# Patient Record
Sex: FEMALE | Race: BLACK OR AFRICAN AMERICAN | NOT HISPANIC OR LATINO | URBAN - METROPOLITAN AREA
[De-identification: names, ages, dates, MRNs, and addresses within clinical notes are randomized per-mention and may not be internally consistent; named-entity substitution may affect disease eponyms.]

---

## 2020-03-24 ENCOUNTER — INPATIENT (INPATIENT)
Facility: HOSPITAL | Age: 55
LOS: 12 days | Discharge: HOME | End: 2020-04-06
Attending: INTERNAL MEDICINE | Admitting: INTERNAL MEDICINE
Payer: COMMERCIAL

## 2020-03-24 VITALS
RESPIRATION RATE: 28 BRPM | HEART RATE: 110 BPM | DIASTOLIC BLOOD PRESSURE: 104 MMHG | TEMPERATURE: 98 F | OXYGEN SATURATION: 91 % | SYSTOLIC BLOOD PRESSURE: 178 MMHG

## 2020-03-24 DIAGNOSIS — B34.2 CORONAVIRUS INFECTION, UNSPECIFIED: ICD-10-CM

## 2020-03-24 DIAGNOSIS — Z29.9 ENCOUNTER FOR PROPHYLACTIC MEASURES, UNSPECIFIED: ICD-10-CM

## 2020-03-24 DIAGNOSIS — R06.03 ACUTE RESPIRATORY DISTRESS: ICD-10-CM

## 2020-03-24 DIAGNOSIS — R65.10 SYSTEMIC INFLAMMATORY RESPONSE SYNDROME (SIRS) OF NON-INFECTIOUS ORIGIN WITHOUT ACUTE ORGAN DYSFUNCTION: ICD-10-CM

## 2020-03-24 DIAGNOSIS — D86.9 SARCOIDOSIS, UNSPECIFIED: ICD-10-CM

## 2020-03-24 DIAGNOSIS — Z02.9 ENCOUNTER FOR ADMINISTRATIVE EXAMINATIONS, UNSPECIFIED: ICD-10-CM

## 2020-03-24 LAB
ALBUMIN SERPL ELPH-MCNC: 3.5 G/DL — SIGNIFICANT CHANGE UP (ref 3.3–5)
ALP SERPL-CCNC: 78 U/L — SIGNIFICANT CHANGE UP (ref 40–120)
ALT FLD-CCNC: 42 U/L — HIGH (ref 4–33)
ANION GAP SERPL CALC-SCNC: 12 MMO/L — SIGNIFICANT CHANGE UP (ref 7–14)
ANISOCYTOSIS BLD QL: SLIGHT — SIGNIFICANT CHANGE UP
APTT BLD: 25.4 SEC — LOW (ref 27.5–36.3)
AST SERPL-CCNC: 50 U/L — HIGH (ref 4–32)
BASE EXCESS BLDV CALC-SCNC: 5.9 MMOL/L — SIGNIFICANT CHANGE UP
BASOPHILS # BLD AUTO: 0.02 K/UL — SIGNIFICANT CHANGE UP (ref 0–0.2)
BASOPHILS NFR BLD AUTO: 0.2 % — SIGNIFICANT CHANGE UP (ref 0–2)
BASOPHILS NFR SPEC: 0 % — SIGNIFICANT CHANGE UP (ref 0–2)
BILIRUB SERPL-MCNC: 0.7 MG/DL — SIGNIFICANT CHANGE UP (ref 0.2–1.2)
BLASTS # FLD: 0 % — SIGNIFICANT CHANGE UP (ref 0–0)
BLOOD GAS VENOUS - CREATININE: 0.8 MG/DL — SIGNIFICANT CHANGE UP (ref 0.5–1.3)
BUN SERPL-MCNC: 14 MG/DL — SIGNIFICANT CHANGE UP (ref 7–23)
CALCIUM SERPL-MCNC: 8.6 MG/DL — SIGNIFICANT CHANGE UP (ref 8.4–10.5)
CHLORIDE BLDV-SCNC: 103 MMOL/L — SIGNIFICANT CHANGE UP (ref 96–108)
CHLORIDE SERPL-SCNC: 98 MMOL/L — SIGNIFICANT CHANGE UP (ref 98–107)
CK MB BLD-MCNC: 0.7 — SIGNIFICANT CHANGE UP (ref 0–2.5)
CK MB BLD-MCNC: 2.14 NG/ML — SIGNIFICANT CHANGE UP (ref 1–4.7)
CK SERPL-CCNC: 288 U/L — HIGH (ref 25–170)
CO2 SERPL-SCNC: 27 MMOL/L — SIGNIFICANT CHANGE UP (ref 22–31)
CREAT SERPL-MCNC: 0.77 MG/DL — SIGNIFICANT CHANGE UP (ref 0.5–1.3)
EOSINOPHIL # BLD AUTO: 0.01 K/UL — SIGNIFICANT CHANGE UP (ref 0–0.5)
EOSINOPHIL NFR BLD AUTO: 0.1 % — SIGNIFICANT CHANGE UP (ref 0–6)
EOSINOPHIL NFR FLD: 0 % — SIGNIFICANT CHANGE UP (ref 0–6)
FERRITIN SERPL-MCNC: 278.8 NG/ML — HIGH (ref 15–150)
GAS PNL BLDV: 138 MMOL/L — SIGNIFICANT CHANGE UP (ref 136–146)
GIANT PLATELETS BLD QL SMEAR: PRESENT — SIGNIFICANT CHANGE UP
GLUCOSE BLDV-MCNC: 94 MG/DL — SIGNIFICANT CHANGE UP (ref 70–99)
GLUCOSE SERPL-MCNC: 92 MG/DL — SIGNIFICANT CHANGE UP (ref 70–99)
HCO3 BLDV-SCNC: 28 MMOL/L — HIGH (ref 20–27)
HCT VFR BLD CALC: 35.8 % — SIGNIFICANT CHANGE UP (ref 34.5–45)
HCT VFR BLDV CALC: 37.5 % — SIGNIFICANT CHANGE UP (ref 34.5–45)
HGB BLD-MCNC: 11.5 G/DL — SIGNIFICANT CHANGE UP (ref 11.5–15.5)
HGB BLDV-MCNC: 12.2 G/DL — SIGNIFICANT CHANGE UP (ref 11.5–15.5)
HYPOCHROMIA BLD QL: SLIGHT — SIGNIFICANT CHANGE UP
IMM GRANULOCYTES NFR BLD AUTO: 1.3 % — SIGNIFICANT CHANGE UP (ref 0–1.5)
INR BLD: 1.22 — HIGH (ref 0.88–1.17)
LACTATE BLDV-MCNC: 1.2 MMOL/L — SIGNIFICANT CHANGE UP (ref 0.5–2)
LDH SERPL L TO P-CCNC: 526 U/L — HIGH (ref 135–225)
LYMPHOCYTES # BLD AUTO: 0.18 K/UL — LOW (ref 1–3.3)
LYMPHOCYTES # BLD AUTO: 1.4 % — LOW (ref 13–44)
LYMPHOCYTES NFR SPEC AUTO: 0.9 % — LOW (ref 13–44)
MACROCYTES BLD QL: SLIGHT — SIGNIFICANT CHANGE UP
MAGNESIUM SERPL-MCNC: 2.2 MG/DL — SIGNIFICANT CHANGE UP (ref 1.6–2.6)
MCHC RBC-ENTMCNC: 30.3 PG — SIGNIFICANT CHANGE UP (ref 27–34)
MCHC RBC-ENTMCNC: 32.1 % — SIGNIFICANT CHANGE UP (ref 32–36)
MCV RBC AUTO: 94.2 FL — SIGNIFICANT CHANGE UP (ref 80–100)
METAMYELOCYTES # FLD: 0 % — SIGNIFICANT CHANGE UP (ref 0–1)
MONOCYTES # BLD AUTO: 0.33 K/UL — SIGNIFICANT CHANGE UP (ref 0–0.9)
MONOCYTES NFR BLD AUTO: 2.6 % — SIGNIFICANT CHANGE UP (ref 2–14)
MONOCYTES NFR BLD: 0.9 % — LOW (ref 2–9)
MYELOCYTES NFR BLD: 0 % — SIGNIFICANT CHANGE UP (ref 0–0)
NEUTROPHIL AB SER-ACNC: 98.2 % — HIGH (ref 43–77)
NEUTROPHILS # BLD AUTO: 12.08 K/UL — HIGH (ref 1.8–7.4)
NEUTROPHILS NFR BLD AUTO: 94.4 % — HIGH (ref 43–77)
NEUTS BAND # BLD: 0 % — SIGNIFICANT CHANGE UP (ref 0–6)
NRBC # FLD: 0 K/UL — SIGNIFICANT CHANGE UP (ref 0–0)
NT-PROBNP SERPL-SCNC: 35.64 PG/ML — SIGNIFICANT CHANGE UP
OTHER - HEMATOLOGY %: 0 — SIGNIFICANT CHANGE UP
OVALOCYTES BLD QL SMEAR: SLIGHT — SIGNIFICANT CHANGE UP
PCO2 BLDV: 51 MMHG — SIGNIFICANT CHANGE UP (ref 41–51)
PH BLDV: 7.4 PH — SIGNIFICANT CHANGE UP (ref 7.32–7.43)
PHOSPHATE SERPL-MCNC: 3.2 MG/DL — SIGNIFICANT CHANGE UP (ref 2.5–4.5)
PLATELET # BLD AUTO: 264 K/UL — SIGNIFICANT CHANGE UP (ref 150–400)
PLATELET COUNT - ESTIMATE: NORMAL — SIGNIFICANT CHANGE UP
PMV BLD: 10.8 FL — SIGNIFICANT CHANGE UP (ref 7–13)
PO2 BLDV: 27 MMHG — LOW (ref 35–40)
POIKILOCYTOSIS BLD QL AUTO: SLIGHT — SIGNIFICANT CHANGE UP
POTASSIUM BLDV-SCNC: 3.3 MMOL/L — LOW (ref 3.4–4.5)
POTASSIUM SERPL-MCNC: 3.6 MMOL/L — SIGNIFICANT CHANGE UP (ref 3.5–5.3)
POTASSIUM SERPL-SCNC: 3.6 MMOL/L — SIGNIFICANT CHANGE UP (ref 3.5–5.3)
PROMYELOCYTES # FLD: 0 % — SIGNIFICANT CHANGE UP (ref 0–0)
PROT SERPL-MCNC: 7.9 G/DL — SIGNIFICANT CHANGE UP (ref 6–8.3)
PROTHROM AB SERPL-ACNC: 14.1 SEC — HIGH (ref 9.8–13.1)
RBC # BLD: 3.8 M/UL — SIGNIFICANT CHANGE UP (ref 3.8–5.2)
RBC # FLD: 13 % — SIGNIFICANT CHANGE UP (ref 10.3–14.5)
REVIEW TO FOLLOW: YES — SIGNIFICANT CHANGE UP
SAO2 % BLDV: 36 % — LOW (ref 60–85)
SODIUM SERPL-SCNC: 137 MMOL/L — SIGNIFICANT CHANGE UP (ref 135–145)
TROPONIN T, HIGH SENSITIVITY: 10 NG/L — SIGNIFICANT CHANGE UP (ref ?–14)
VARIANT LYMPHS # BLD: 0 % — SIGNIFICANT CHANGE UP
WBC # BLD: 12.79 K/UL — HIGH (ref 3.8–10.5)
WBC # FLD AUTO: 12.79 K/UL — HIGH (ref 3.8–10.5)

## 2020-03-24 PROCEDURE — 99222 1ST HOSP IP/OBS MODERATE 55: CPT | Mod: GC,AI

## 2020-03-24 PROCEDURE — 71045 X-RAY EXAM CHEST 1 VIEW: CPT | Mod: 26

## 2020-03-24 RX ORDER — IPRATROPIUM BROMIDE 0.2 MG/ML
1 SOLUTION, NON-ORAL INHALATION EVERY 6 HOURS
Refills: 0 | Status: DISCONTINUED | OUTPATIENT
Start: 2020-03-24 | End: 2020-04-06

## 2020-03-24 RX ORDER — HYDROXYCHLOROQUINE SULFATE 200 MG
400 TABLET ORAL DAILY
Refills: 0 | Status: DISCONTINUED | OUTPATIENT
Start: 2020-03-24 | End: 2020-03-24

## 2020-03-24 RX ORDER — EPINEPHRINE 0.3 MG/.3ML
0.3 INJECTION INTRAMUSCULAR; SUBCUTANEOUS ONCE
Refills: 0 | Status: COMPLETED | OUTPATIENT
Start: 2020-03-24 | End: 2020-03-24

## 2020-03-24 RX ORDER — HYDROXYCHLOROQUINE SULFATE 200 MG
400 TABLET ORAL DAILY
Refills: 0 | Status: COMPLETED | OUTPATIENT
Start: 2020-03-24 | End: 2020-03-25

## 2020-03-24 RX ORDER — CHOLECALCIFEROL (VITAMIN D3) 125 MCG
1000 CAPSULE ORAL DAILY
Refills: 0 | Status: DISCONTINUED | OUTPATIENT
Start: 2020-03-24 | End: 2020-04-06

## 2020-03-24 RX ORDER — ALBUTEROL 90 UG/1
2.5 AEROSOL, METERED ORAL EVERY 6 HOURS
Refills: 0 | Status: DISCONTINUED | OUTPATIENT
Start: 2020-03-24 | End: 2020-03-24

## 2020-03-24 RX ORDER — CHOLECALCIFEROL (VITAMIN D3) 125 MCG
1 CAPSULE ORAL
Qty: 0 | Refills: 0 | DISCHARGE

## 2020-03-24 RX ORDER — AZITHROMYCIN 500 MG/1
500 TABLET, FILM COATED ORAL DAILY
Refills: 0 | Status: DISCONTINUED | OUTPATIENT
Start: 2020-03-24 | End: 2020-03-24

## 2020-03-24 RX ORDER — METHOTREXATE 2.5 MG/1
0 TABLET ORAL
Qty: 0 | Refills: 0 | DISCHARGE

## 2020-03-24 RX ORDER — ALBUTEROL 90 UG/1
0.5 AEROSOL, METERED ORAL
Qty: 0 | Refills: 0 | DISCHARGE

## 2020-03-24 RX ORDER — ALBUTEROL 90 UG/1
6 AEROSOL, METERED ORAL ONCE
Refills: 0 | Status: COMPLETED | OUTPATIENT
Start: 2020-03-24 | End: 2020-03-24

## 2020-03-24 RX ORDER — BUDESONIDE, MICRONIZED 100 %
2 POWDER (GRAM) MISCELLANEOUS
Qty: 0 | Refills: 0 | DISCHARGE

## 2020-03-24 RX ORDER — IPRATROPIUM BROMIDE 0.2 MG/ML
2 SOLUTION, NON-ORAL INHALATION ONCE
Refills: 0 | Status: COMPLETED | OUTPATIENT
Start: 2020-03-24 | End: 2020-03-24

## 2020-03-24 RX ORDER — BUDESONIDE, MICRONIZED 100 %
0.5 POWDER (GRAM) MISCELLANEOUS DAILY
Refills: 0 | Status: DISCONTINUED | OUTPATIENT
Start: 2020-03-24 | End: 2020-03-26

## 2020-03-24 RX ORDER — ENOXAPARIN SODIUM 100 MG/ML
40 INJECTION SUBCUTANEOUS DAILY
Refills: 0 | Status: DISCONTINUED | OUTPATIENT
Start: 2020-03-24 | End: 2020-04-06

## 2020-03-24 RX ORDER — FLUTICASONE PROPIONATE AND SALMETEROL 50; 250 UG/1; UG/1
0 POWDER ORAL; RESPIRATORY (INHALATION)
Qty: 0 | Refills: 0 | DISCHARGE

## 2020-03-24 RX ORDER — ALBUTEROL 90 UG/1
2 AEROSOL, METERED ORAL EVERY 6 HOURS
Refills: 0 | Status: DISCONTINUED | OUTPATIENT
Start: 2020-03-24 | End: 2020-04-06

## 2020-03-24 RX ORDER — SODIUM CHLORIDE 9 MG/ML
1000 INJECTION INTRAMUSCULAR; INTRAVENOUS; SUBCUTANEOUS ONCE
Refills: 0 | Status: COMPLETED | OUTPATIENT
Start: 2020-03-24 | End: 2020-03-24

## 2020-03-24 RX ADMIN — SODIUM CHLORIDE 1000 MILLILITER(S): 9 INJECTION INTRAMUSCULAR; INTRAVENOUS; SUBCUTANEOUS at 11:09

## 2020-03-24 RX ADMIN — ALBUTEROL 6 PUFF(S): 90 AEROSOL, METERED ORAL at 11:00

## 2020-03-24 RX ADMIN — Medication 1 PUFF(S): at 22:53

## 2020-03-24 RX ADMIN — EPINEPHRINE 0.3 MILLIGRAM(S): 0.3 INJECTION INTRAMUSCULAR; SUBCUTANEOUS at 11:09

## 2020-03-24 RX ADMIN — ALBUTEROL 2 PUFF(S): 90 AEROSOL, METERED ORAL at 22:53

## 2020-03-24 RX ADMIN — Medication 40 MILLIGRAM(S): at 11:09

## 2020-03-24 RX ADMIN — Medication 2 PUFF(S): at 13:59

## 2020-03-24 RX ADMIN — AZITHROMYCIN 500 MILLIGRAM(S): 500 TABLET, FILM COATED ORAL at 20:08

## 2020-03-24 NOTE — H&P ADULT - NSHPREVIEWOFSYSTEMS_GEN_ALL_CORE
REVIEW OF SYSTEMS:  CONSTITUTIONAL: No fever, chills, night sweats, or fatigue  EYES: No eye pain, visual disturbances, or discharge  ENMT:  No difficulty hearing, tinnitus, vertigo; No sinus or throat pain  NECK: No pain or stiffness  BREASTS: No pain, masses, or nipple discharge  RESPIRATORY: No cough, wheezing, or hemoptysis; No shortness of breath  CARDIOVASCULAR: No chest pain, palpitations, dizziness, or leg swelling  GASTROINTESTINAL: No abdominal or epigastric pain. No nausea, vomiting, or hematemesis; No diarrhea or constipation. No melena or hematochezia.  GENITOURINARY: No dysuria, frequency, hematuria, or incontinence  NEUROLOGICAL: No headaches, memory loss, loss of strength, numbness, or tremors  SKIN: No itching, burning, rashes, or lesions   LYMPH NODES: No enlarged glands  ENDOCRINE: No heat or cold intolerance; No hair loss  MUSCULOSKELETAL: No joint pain or swelling; No muscle, back, or extremity pain  PSYCHIATRIC: No depression, anxiety, mood swings, or difficulty sleeping  HEME/LYMPH: No easy bruising, or bleeding gums  ALLERGY AND IMMUNOLOGIC: No hives or eczema REVIEW OF SYSTEMS:  CONSTITUTIONAL: No fever, chills, night sweats, or fatigue  EYES: No eye pain, visual disturbances, or discharge  ENMT:  No difficulty hearing, tinnitus, vertigo; No sinus or throat pain  NECK: No pain or stiffness  RESPIRATORY: Cough. Denies wheezing, or hemoptysis.  CARDIOVASCULAR: No chest pain, palpitations, dizziness, or leg swelling  GASTROINTESTINAL: No abdominal or epigastric pain. No nausea, vomiting, or hematemesis; No diarrhea or constipation. No melena or hematochezia.  GENITOURINARY: No dysuria, frequency, hematuria, or incontinence  NEUROLOGICAL: No headaches, memory loss, loss of strength, numbness, or tremors  SKIN: No itching, burning, rashes, or lesions   LYMPH NODES: No enlarged glands  ENDOCRINE: No heat or cold intolerance  MUSCULOSKELETAL: No joint pain or swelling;   HEME/LYMPH: No easy bruising, or bleeding gums  ALLERGY AND IMMUNOLOGIC: No hives or eczema REVIEW OF SYSTEMS:  CONSTITUTIONAL: No fever, chills, night sweats, or fatigue  EYES: No eye pain, visual disturbances, or discharge  ENMT:  No difficulty hearing, tinnitus, vertigo; No sinus or throat pain  NECK: No pain or stiffness  RESPIRATORY: Cough. Denies wheezing, or hemoptysis.  CARDIOVASCULAR: No chest pain, palpitations, dizziness, or leg swelling  GASTROINTESTINAL: No abdominal or epigastric pain. No nausea, vomiting, or hematemesis; No diarrhea or constipation. No melena or hematochezia.  GENITOURINARY: No dysuria, frequency, hematuria, or incontinence  NEUROLOGICAL: No headaches, memory loss, loss of strength, numbness, or tremors  SKIN: No itching, burning, rashes, or lesions   ENDOCRINE: No heat or cold intolerance  MUSCULOSKELETAL: No joint pain or swelling;   HEME/LYMPH: No easy bruising, or bleeding gums  ALLERGY AND IMMUNOLOGIC: No hives or eczema

## 2020-03-24 NOTE — H&P ADULT - ASSESSMENT
54/F w/ sarcoidosis (2017, on mtx 4x/wk, plaquenil 400 QD, prednisone 5) and no other significant PMH p/w 2w hx of productive cough and worsening SOB.  Found to have b/l LL opacities on CXR, tested positive for COVID-19 at outside facility a/f COVID-19 PNA w/ possible sarcoidosis flare.

## 2020-03-24 NOTE — ED ADULT NURSE REASSESSMENT NOTE - NS ED NURSE REASSESS COMMENT FT1
Report received from day RN. Patient resting comfortably. Reports improvement in shortness of breath. Respirations even and unlabored. NSR on cardiac monitor. VS as noted. Will continue to monitor.

## 2020-03-24 NOTE — ED ADULT TRIAGE NOTE - CHIEF COMPLAINT QUOTE
Pt c/o SOB and cough. Pt with tachypnea. Pt had Covid test with no results yet. Pt 02 sat 70% upon EMS arrival.

## 2020-03-24 NOTE — ED PROVIDER NOTE - PHYSICAL EXAMINATION
Vital signs reviewed.   CONSTITUTIONAL: Ill=appearing; well-nourished; in moderate respiratory distress.   HEAD: Normocephalic, atraumatic.  EYES: PERRL, EOM intact, conjunctiva and sclera WNL.  ENT: normal nose; no rhinorrhea; normal pharynx with no tonsillar hypertrophy, no erythema, no exudate, no lymphadenopathy.  NECK/LYMPH: Supple; non-tender; no cervical lymphadenopathy.  CARD: Tachycardic, normal S1, S2, no lower extremity edema or calf tenderness   RESP: Tachypneic, +accessory muscle use, +intercostal retraction, breath sounds diffusely decreased bilaterally; no wheezes, rhonchi, or rales.  ABD/GI: soft, non-distended; non-tender; no palpable organomegaly, no pulsatile mass.  EXT/MS: moves all extremities; distal pulses are normal, no pedal edema.  SKIN: Normal for age and race; warm; dry; good turgor; no apparent lesions or exudate noted.  NEURO: Awake, alert, oriented x 3, no gross deficits, CN II-XII grossly intact, no motor or sensory deficit noted.  PSYCH: Normal mood; appropriate affect.

## 2020-03-24 NOTE — H&P ADULT - NSHPSOCIALHISTORY_GEN_ALL_CORE
- live w/  in house  - lives w/ son, who has not been to the house for weeks  - no tobacco use  - occasional etoh use, 1-2 drinks per month; multivitamins  - no illicit drug

## 2020-03-24 NOTE — ED PROVIDER NOTE - OBJECTIVE STATEMENT
53 y/o F PMHx sarcoidosis here c/o worsening shortness of breath and CADE x 2 weeks. Pt states her daily Prednisone was decreased to 5mg, 3 weeks ago, prior to onset of sx. States this usually happens to her when her doses are decreased. +chronic cough, attributed to sarcoidosis. No sick contacts. Of note, pt and her  were tested for COVID at the [facility] through 5 days ago as a precaution,  tested negative, her covid test is still pending. Denies fevers/chills, lower ext edema, calf pain or chest pain. Has been working from home for several weeks. 55 y/o F PMHx sarcoidosis here c/o worsening shortness of breath and CADE x 2 weeks. Pt states her daily Prednisone was decreased to 5mg, 3 weeks ago, prior to onset of sx. States this usually happens to her when her doses are decreased. +chronic cough, attributed to sarcoidosis. No sick contacts. Of note, pt and her  were tested for COVID at the [facility] through 5 days ago as a precaution,  tested negative, her covid test is still pending. Went to [facility] 5 days ago, had a normal cxr and was given a z-pack, which she has one dose remaining for. Denies fevers/chills, lower ext edema, calf pain or chest pain. Has been working from home for several weeks.

## 2020-03-24 NOTE — ED PROVIDER NOTE - NS ED ROS FT
ROS:  GENERAL: No fever, no chills  EYES: no change in vision  HEENT: no trouble swallowing, no trouble speaking  CARDIAC: no chest pain  PULMONARY: +cough, +shortness of breath  GI: no abdominal pain, no nausea, no vomiting, no diarrhea, no constipation  : No dysuria, no frequency, no change in appearance, or odor of urine  SKIN: no rashes  NEURO: no headache, no weakness  MSK: No joint pain

## 2020-03-24 NOTE — ED PROVIDER NOTE - CLINICAL SUMMARY MEDICAL DECISION MAKING FREE TEXT BOX
53 y/o F PMHx sarcoidosis here c/o worsening shortness of breath and CADE x 2 weeks. Pt states her daily Prednisone was decreased to 5mg, 3 weeks ago, prior to onset of sx. Pending results of outpatient covid test. Pt appears tachypneic, in moderate respiratory distress, agrees with intubation if necessary. Concern for covid, causing sarcoid exacerbation. Plan epi IM, albuterol/ipratropium w/ spacer, steroids, cxr, reassess

## 2020-03-24 NOTE — H&P ADULT - PROBLEM SELECTOR PLAN 2
p/w 2 wk hx of productive cough and worsening SOB, found to have b/l lower lobe opacities  - RVP neg  - tested positive for COVID-19 at outside facility, pending COVID-19 PCR here  - previously given solumedrol 40, will c/w home Prednisone 5mg PO QD  - currently sating mid-90s on 6L NC, not on home O2 at baseline  - goal O2 sat low to mid-90s given lung hx, can escalate to NRB; will c/s MICU for sharply increasing O2 demand or increased WOB  - previously given Zpack; start azithromycin 500 PO QD  - daily EKG  - replete Mg to 3

## 2020-03-24 NOTE — H&P ADULT - NSHPPHYSICALEXAM_GEN_ALL_CORE
Vital Signs Last 24 Hrs  T(C): 36.4 (24 Mar 2020 12:40), Max: 37.2 (24 Mar 2020 11:15)  T(F): 97.5 (24 Mar 2020 12:40), Max: 98.9 (24 Mar 2020 11:15)  HR: 90 (24 Mar 2020 18:10) (90 - 120)  BP: 138/82 (24 Mar 2020 18:10) (135/80 - 178/104)  BP(mean): --  RR: 30 (24 Mar 2020 18:10) (28 - 38)  SpO2: 98% (24 Mar 2020 18:10) (91% - 99%)    VITALS:     GENERAL: NAD, lying in bed comfortably  HEAD:  Atraumatic, Normocephalic  EYES: EOMI, PERRLA, conjunctiva and sclera clear  ENT: Moist mucous membranes  NECK: Supple, No JVD  CHEST/LUNG: Clear to auscultation bilaterally; No rales, rhonchi, wheezing, or rubs. Unlabored respirations  HEART: Regular rate and rhythm; No murmurs, rubs, or gallops  ABDOMEN: Bowel sounds present; Soft, Nontender, Nondistended. No hepatomegally  EXTREMITIES:  2+ Peripheral Pulses, brisk capillary refill. No clubbing, cyanosis, or edema  NERVOUS SYSTEM:  Alert & Oriented X3, speech clear. No deficits   MSK: FROM all 4 extremities, full and equal strength  SKIN: No rashes or lesions Vital Signs Last 24 Hrs  T(C): 36.4 (24 Mar 2020 12:40), Max: 37.2 (24 Mar 2020 11:15)  T(F): 97.5 (24 Mar 2020 12:40), Max: 98.9 (24 Mar 2020 11:15)  HR: 90 (24 Mar 2020 18:10) (90 - 120)  BP: 138/82 (24 Mar 2020 18:10) (135/80 - 178/104)  BP(mean): --  RR: 30 (24 Mar 2020 18:10) (28 - 38)  SpO2: 98% (24 Mar 2020 18:10) (91% - 99%)    GENERAL: in ED stretcher, on 6L NC, NAD  HEAD:  Atraumatic, Normocephalic  EYES: EOMI, PERRLA, conjunctiva and sclera clear  ENT: Moist mucous membranes  NECK: Supple, No JVD  CHEST/LUNG: Diminished lung sounds in all fields, but CTABL; No rales, rhonchi, wheezing, or rubs. Unlabored respirations  HEART: Tachycardia to 100; regular rhythm; no M/R/G  ABDOMEN: Bowel sounds present; Soft, Nontender, Nondistended  EXTREMITIES:  2+ Peripheral Pulses, brisk capillary refill. No clubbing, cyanosis, or edema  NERVOUS SYSTEM:  Alert & Oriented X3, speech clear. No deficits   MSK: FROM all 4 extremities, full and equal strength  SKIN: No rashes or lesions

## 2020-03-24 NOTE — H&P ADULT - NSHPLABSRESULTS_GEN_ALL_CORE
11.5   12.79 )-----------( 264      ( 24 Mar 2020 11:06 )             35.8     03-24    137  |  98  |  14  ----------------------------<  92  3.6   |  27  |  0.77    Ca    8.6      24 Mar 2020 11:06    TPro  7.9  /  Alb  3.5  /  TBili  0.7  /  DBili  x   /  AST  50<H>  /  ALT  42<H>  /  AlkPhos  78  03-24    Blood Gas Venous Comprehensive (03.24.20 @ 11:06)    Blood Gas Venous - Lactate: 1.2: Please note updated reference range. mmol/L    pH, Venous: 7.40 pH    pCO2, Venous: 51 mmHg    pO2, Venous: 27 mmHg    HCO3, Venous: 28 mmol/L    Troponin T, High Sensitivity (03.24.20 @ 11:06)    Troponin T, High Sensitivity: 10  Serum Pro-Brain Natriuretic Peptide (03.24.20 @ 11:06)    Serum Pro-Brain Natriuretic Peptide: 35.64    < from: Xray Chest 1 View-PORTABLE IMMEDIATE (03.24.20 @ 11:06) >    INTERPRETATION:  Indication: Shortness of breath.    Technique: Single upright portable view of the chest.    Comparison: None.    Findings: The cardiac silhouette is normal in size. There are small airspace opacities in the bilateral lower lobes. The hilar and mediastinal structures appear unremarkable. The visualized osseous structures are intact.    Impression: Multifocal pneumonia involving the bilateral lower lobes.    < end of copied text >

## 2020-03-24 NOTE — H&P ADULT - NSHPOUTPATIENTPROVIDERS_GEN_ALL_CORE
PCP: Dr. Kyle Conner, [facility], [facility], [phone].  Pulmonology: Amos Page, [facility], [phone].

## 2020-03-24 NOTE — H&P ADULT - ATTENDING COMMENTS
pt with known history of sarcoidosis - will obtain pulm eval in am, for now d/c mtx, continue with home dose of steroids and plaquenil.  She tested + for Covid at an outside facility - supportive care.  monitor respiratory status. will hold off on further antibiotics for now.

## 2020-03-24 NOTE — H&P ADULT - PROBLEM SELECTOR PLAN 1
p/w tachycardia, tachypnea, and leukocytosis likely in the setting COVID-19 infection  - see COVID-19 infection below

## 2020-03-24 NOTE — H&P ADULT - HISTORY OF PRESENT ILLNESS
whitish sputum cough, SOB worsening, ... gave azithromycin x5 days, no sick contact, no recent travel, just to  for test;    sarcoid, 2017, not hospitalized for, dx lung bx (in [place]), f/u Amos Stanley of [facility] pulm, naproxen, hydrochloriquine 400 QD, prednisone as high as 40 and now 5 mg (sarcoid pain left eye, blurry vision)    PMH: none    PSH: none    FH:  Dad  from kidney failure; mom is healthy  6 siblings, all healthy    allergies: NKDA    pharm: [facility], [address]    SH:  live w/  in house  lives w/ son  no tobacco use, occassional etoh use, 1-2 drinks per month; multivitamins, no illicit drug    PCP: Dr. Kyle Conner, [phone], [facility] Ms. Nguyen is a 54/F w/ sarcoidosis (dx 2017; on methotrexate and hydroxychloroquine) p/w 2wk hx of cough producing whitish sputum now w/ worsening SOB.  Pt had previously gone to  to test for COVID-19; she received confirmation tonight that her test was positive.  Five days ago, she presented to [facility] w/ similar sxs and was given a Zpack and Prednisone 20mg x4days.  She did not improved, and so presented to our ED.  She denies hx of fever, chills, N/V/D/C, abd pain.      Her sarcoidosis was diagnosed in 2017 by lung biopsy (at a hospital somewhere in [place]).  She was never hospitalized for it.  Ms. Nguyen follows w/ Dr. Amos Page of [facility], and is currently on naproxen, methotrexate, and hydrochloriquine 400 QD (confirmed w/ [facility] on [facility]).  Dr. Page had reduced her maintenance Prednisone to 5mg QD about 3 w/a.  Previous attempts to reduce her steroids had resulted in flares c/b left eye pain and blurry vision.      In the ED, pt was       FH:  Dad  from kidney failure; mom is healthy  6 siblings, all healthy    allergies: NKDA    pharm: [facility], [address] [facility]    SH:  live w/  in house  lives w/ son  no tobacco use, occassional etoh use, 1-2 drinks per month; multivitamins, no illicit drug    PCP: Dr. Kyle Conner, [phone], [facility] Ms. Nguyen is a 54/F w/ sarcoidosis (dx 2017; on methotrexate and hydroxychloroquine) p/w 2wk hx of cough producing whitish sputum now w/ worsening SOB.  Pt had previously gone to  to test for COVID-19; she received confirmation tonight that her test was positive.  Five days ago, she presented to [facility] w/ similar sxs and was given a Zpack and Prednisone 20mg x4days.  She did not improved, and so presented to our ED.  She denies hx of fever, chills, N/V/D/C, abd pain.      Her sarcoidosis was diagnosed in 2017 by lung biopsy (at a hospital somewhere in [place]).  She was never hospitalized for it.  Ms. Nguyen follows w/ Dr. Amos Page of [facility], and is currently on naproxen, methotrexate, and hydrochloriquine 400 QD (confirmed w/ [facility] on [facility]).  Dr. Page had reduced her maintenance Prednisone to 5mg QD about 3 w/a.  Previous attempts to reduce her steroids had resulted in flares c/b left eye pain and blurry vision.      In the ED, pt was afebrile 97.6,       FH:  Dad  from kidney failure; mom is healthy  6 siblings, all healthy    allergies: NKDA    [facility], [address] [facility]    SH:  live w/  in house  lives w/ son  no tobacco use, occassional etoh use, 1-2 drinks per month; multivitamins, no illicit drug    PCP: Dr. Kyle Conner, [phone], [facility] Ms. Nguyen is a 54/F w/ sarcoidosis (dx 2017; on methotrexate and hydroxychloroquine) p/w 2wk hx of cough producing whitish sputum now w/ worsening SOB.  Pt had previously gone to  to test for COVID-19; she received confirmation tonight that her test was positive.  Five days ago, she presented to [facility] w/ similar sxs and was given a Zpack and Prednisone 20mg x4days.  She did not improved, and so presented to our ED.  She denies hx of fever, chills, N/V/D/C, abd pain.      Her sarcoidosis was diagnosed in 2017 by lung biopsy (at a hospital somewhere in [place]).  She was never hospitalized for it.  Ms. Nguyen follows w/ Dr. Amos Page of [facility], and is currently on naproxen, methotrexate, and hydrochloriquine 400 QD (confirmed w/ [facility] on [facility]).  Dr. Page had reduced her maintenance Prednisone to 5mg QD about 3 w/a.  Previous attempts to reduce her steroids had resulted in flares c/b left eye pain and blurry vision.      In the ED, pt was afebrile 97.6, tachycardic 120, -178/, tachypneic to 38; sating 91% on 3L NC moved to 96% on 6L NC.  CXR shows b/l LL opacities.  Given Epinephrine 0.3mg, Levoquine, Solumedrol 40, and 1L NS.

## 2020-03-24 NOTE — ED PROVIDER NOTE - ATTENDING CONTRIBUTION TO CARE
55y/o F PMHx sarcoidosis c/o worsening shortness of breath and CADE x 2 weeks. Pending results of outpatient covid test. Pt appears tachypneic, in moderate respiratory distress, agrees with intubation if necessary. Concern for covid, causing sarcoid exacerbation. epi IM, albuterol/ipratropium w/ spacer, steroids, cxr, reassess.   GEN - NAD; well appearing; A+O x3   HEAD - NC/AT     EYES - EOMI, no conjunctival pallor, no scleral icterus  ENT -   mucous membranes  moist , no discharge      NECK - Neck supple  PULM - rales bilaterally   COR -  RRR, S1 S2, no murmurs  ABD - , ND, NT, soft, no guarding, no rebound, no masses    BACK - no CVA tenderness, nontender spine     EXTREMS - no edema, no deformity, warm and well perfused    SKIN - no rash or bruising      NEUROLOGIC - alert, sensation nl, motor 5/5 RUE/LUE/RLE/LLE

## 2020-03-25 LAB
ALBUMIN SERPL ELPH-MCNC: 3.4 G/DL — SIGNIFICANT CHANGE UP (ref 3.3–5)
ALP SERPL-CCNC: 72 U/L — SIGNIFICANT CHANGE UP (ref 40–120)
ALT FLD-CCNC: 42 U/L — HIGH (ref 4–33)
ANION GAP SERPL CALC-SCNC: 9 MMO/L — SIGNIFICANT CHANGE UP (ref 7–14)
AST SERPL-CCNC: 44 U/L — HIGH (ref 4–32)
BASOPHILS # BLD AUTO: 0.01 K/UL — SIGNIFICANT CHANGE UP (ref 0–0.2)
BASOPHILS NFR BLD AUTO: 0.1 % — SIGNIFICANT CHANGE UP (ref 0–2)
BILIRUB SERPL-MCNC: 0.9 MG/DL — SIGNIFICANT CHANGE UP (ref 0.2–1.2)
BUN SERPL-MCNC: 14 MG/DL — SIGNIFICANT CHANGE UP (ref 7–23)
CALCIUM SERPL-MCNC: 9 MG/DL — SIGNIFICANT CHANGE UP (ref 8.4–10.5)
CHLORIDE SERPL-SCNC: 99 MMOL/L — SIGNIFICANT CHANGE UP (ref 98–107)
CO2 SERPL-SCNC: 29 MMOL/L — SIGNIFICANT CHANGE UP (ref 22–31)
CREAT SERPL-MCNC: 0.8 MG/DL — SIGNIFICANT CHANGE UP (ref 0.5–1.3)
CRP SERPL-MCNC: 97.2 MG/L — HIGH
EOSINOPHIL # BLD AUTO: 0.01 K/UL — SIGNIFICANT CHANGE UP (ref 0–0.5)
EOSINOPHIL NFR BLD AUTO: 0.1 % — SIGNIFICANT CHANGE UP (ref 0–6)
GLUCOSE SERPL-MCNC: 88 MG/DL — SIGNIFICANT CHANGE UP (ref 70–99)
HCT VFR BLD CALC: 34.3 % — LOW (ref 34.5–45)
HGB BLD-MCNC: 11.2 G/DL — LOW (ref 11.5–15.5)
IMM GRANULOCYTES NFR BLD AUTO: 1.5 % — SIGNIFICANT CHANGE UP (ref 0–1.5)
LYMPHOCYTES # BLD AUTO: 0.23 K/UL — LOW (ref 1–3.3)
LYMPHOCYTES # BLD AUTO: 2 % — LOW (ref 13–44)
MAGNESIUM SERPL-MCNC: 1.9 MG/DL — SIGNIFICANT CHANGE UP (ref 1.6–2.6)
MANUAL SMEAR VERIFICATION: SIGNIFICANT CHANGE UP
MCHC RBC-ENTMCNC: 31 PG — SIGNIFICANT CHANGE UP (ref 27–34)
MCHC RBC-ENTMCNC: 32.7 % — SIGNIFICANT CHANGE UP (ref 32–36)
MCV RBC AUTO: 95 FL — SIGNIFICANT CHANGE UP (ref 80–100)
MONOCYTES # BLD AUTO: 0.33 K/UL — SIGNIFICANT CHANGE UP (ref 0–0.9)
MONOCYTES NFR BLD AUTO: 2.9 % — SIGNIFICANT CHANGE UP (ref 2–14)
NEUTROPHILS # BLD AUTO: 10.8 K/UL — HIGH (ref 1.8–7.4)
NEUTROPHILS NFR BLD AUTO: 93.4 % — HIGH (ref 43–77)
NRBC # FLD: 0 K/UL — SIGNIFICANT CHANGE UP (ref 0–0)
PHOSPHATE SERPL-MCNC: 2.3 MG/DL — LOW (ref 2.5–4.5)
PLATELET # BLD AUTO: 270 K/UL — SIGNIFICANT CHANGE UP (ref 150–400)
PMV BLD: 10.7 FL — SIGNIFICANT CHANGE UP (ref 7–13)
POTASSIUM SERPL-MCNC: 3.6 MMOL/L — SIGNIFICANT CHANGE UP (ref 3.5–5.3)
POTASSIUM SERPL-SCNC: 3.6 MMOL/L — SIGNIFICANT CHANGE UP (ref 3.5–5.3)
PROT SERPL-MCNC: 7 G/DL — SIGNIFICANT CHANGE UP (ref 6–8.3)
RBC # BLD: 3.61 M/UL — LOW (ref 3.8–5.2)
RBC # FLD: 12.8 % — SIGNIFICANT CHANGE UP (ref 10.3–14.5)
SARS-COV-2 RNA SPEC QL NAA+PROBE: DETECTED
SODIUM SERPL-SCNC: 137 MMOL/L — SIGNIFICANT CHANGE UP (ref 135–145)
WBC # BLD: 11.55 K/UL — HIGH (ref 3.8–10.5)
WBC # FLD AUTO: 11.55 K/UL — HIGH (ref 3.8–10.5)

## 2020-03-25 PROCEDURE — 99233 SBSQ HOSP IP/OBS HIGH 50: CPT | Mod: GC

## 2020-03-25 PROCEDURE — 99223 1ST HOSP IP/OBS HIGH 75: CPT | Mod: GC

## 2020-03-25 RX ORDER — HYDROXYCHLOROQUINE SULFATE 200 MG
TABLET ORAL
Refills: 0 | Status: DISCONTINUED | OUTPATIENT
Start: 2020-03-25 | End: 2020-03-25

## 2020-03-25 RX ORDER — HYDROXYCHLOROQUINE SULFATE 200 MG
200 TABLET ORAL EVERY 12 HOURS
Refills: 0 | Status: COMPLETED | OUTPATIENT
Start: 2020-03-25 | End: 2020-03-30

## 2020-03-25 RX ORDER — AZITHROMYCIN 500 MG/1
500 TABLET, FILM COATED ORAL DAILY
Refills: 0 | Status: COMPLETED | OUTPATIENT
Start: 2020-03-25 | End: 2020-03-29

## 2020-03-25 RX ORDER — METHOTREXATE 2.5 MG/1
10 TABLET ORAL
Refills: 0 | Status: DISCONTINUED | OUTPATIENT
Start: 2020-03-25 | End: 2020-04-01

## 2020-03-25 RX ORDER — MAGNESIUM SULFATE 500 MG/ML
1 VIAL (ML) INJECTION ONCE
Refills: 0 | Status: COMPLETED | OUTPATIENT
Start: 2020-03-25 | End: 2020-03-25

## 2020-03-25 RX ADMIN — ALBUTEROL 2 PUFF(S): 90 AEROSOL, METERED ORAL at 06:24

## 2020-03-25 RX ADMIN — ENOXAPARIN SODIUM 40 MILLIGRAM(S): 100 INJECTION SUBCUTANEOUS at 11:51

## 2020-03-25 RX ADMIN — Medication 5 MILLIGRAM(S): at 06:23

## 2020-03-25 RX ADMIN — Medication 400 MILLIGRAM(S): at 06:23

## 2020-03-25 RX ADMIN — ALBUTEROL 2 PUFF(S): 90 AEROSOL, METERED ORAL at 22:40

## 2020-03-25 RX ADMIN — Medication 200 MILLIGRAM(S): at 17:26

## 2020-03-25 RX ADMIN — Medication 100 GRAM(S): at 17:26

## 2020-03-25 RX ADMIN — METHOTREXATE 10 MILLIGRAM(S): 2.5 TABLET ORAL at 22:41

## 2020-03-25 RX ADMIN — Medication 100 MILLIGRAM(S): at 14:06

## 2020-03-25 RX ADMIN — Medication 1 PUFF(S): at 06:24

## 2020-03-25 RX ADMIN — Medication 100 MILLIGRAM(S): at 22:40

## 2020-03-25 RX ADMIN — AZITHROMYCIN 500 MILLIGRAM(S): 500 TABLET, FILM COATED ORAL at 11:51

## 2020-03-25 RX ADMIN — Medication 1 PUFF(S): at 11:51

## 2020-03-25 RX ADMIN — Medication 1 PUFF(S): at 22:41

## 2020-03-25 RX ADMIN — ALBUTEROL 2 PUFF(S): 90 AEROSOL, METERED ORAL at 17:26

## 2020-03-25 RX ADMIN — Medication 1 PUFF(S): at 17:26

## 2020-03-25 RX ADMIN — Medication 1000 UNIT(S): at 11:52

## 2020-03-25 RX ADMIN — ALBUTEROL 2 PUFF(S): 90 AEROSOL, METERED ORAL at 11:52

## 2020-03-25 NOTE — PHARMACOTHERAPY INTERVENTION NOTE - COMMENTS
ANTHONY GRANGER is a 54y Female with a PMH of sarcoidosis for which she takes Plaquenil daily. She was found to be COVID-19 positive and has increasing O2 requirements.    Recommendations:  1. Suggest initiating hydroxychloroquine 200 mg PO q12h x 10 doses (total of 5 days).    Lucy Tena, PharmD  PGY-1 Pharmacy Resident  Spectra [phone]  .

## 2020-03-25 NOTE — CONSULT NOTE ADULT - SUBJECTIVE AND OBJECTIVE BOX
[facility] - Division of Pulmonary, Critical Care and Sleep Medicine   Please call [phone] between 8am-pm weekdays, [phone] after hours and weekends      CHIEF COMPLAINT: Shortness of breath x weeks    HPI:  53 yo F h/o sarcoidosis with lung and eye involvement p/w progressive dyspnea and cough x 2 weeks.  Pt had previously gone to  to test for COVID-19; she received confirmation tonight that her test was positive.  Sarcoidosis was dx 2017 on lung biopsy and she being treated with methotrexate 10 mg weekly, chronic prednisone, and hydroxychloroquine, follows with Dr. Amos Page of [facility]. Five days prior to presentation, she presented to [facility] w/ similar sxs and was given a Zpack and Prednisone 20mg x4days.  Symptoms did not improve and presented to our ED.  She denies hx of fever, chills, N/V/D/C, abd pain.    Dr. Page had reduced her maintenance Prednisone to 5mg QD about 3 weeks prior to presentation.  Previous attempts to reduce her steroids had resulted in flares c/b left eye pain and blurry vision.    In the ED, pt was afebrile 97.6, tachycardic 120, -178/, tachypneic to 38; sating 91% on 3L NC moved to 96% on 6L NC.  CXR shows b/l LL opacities.  Given Epinephrine 0.3mg, Levaquin, Solumedrol 40, and 1L NS.    Currently lying in bed, states her dyspnea is mild as long as she doesn't move    PAST MEDICAL & SURGICAL HISTORY:  Sarcoidosis: dx 2017      FAMILY HISTORY:  FH: kidney failure: father      SOCIAL HISTORY:  Smoking: [ x] Never Smoked [ ] Former Smoker (__ packs x ___ years) [ ] Current Smoker  (__ packs x ___ years)  Substance Use: [ x] Never Used [ ] Used ____  EtOH Use: denies    Allergies  No Known Allergies    HOME MEDICATIONS: reviewed    REVIEW OF SYSTEMS:  Constitutional: [ ] fevers [ ] chills [ ] weight loss [ ] weight gain  HEENT: [ ] dry eyes [ ] eye irritation [ ] postnasal drip [ ] nasal congestion  CV: [ ] chest pain [ ] orthopnea [ ] palpitations [ ] murmur  Resp: [x ] cough [x] shortness of breath [ ] wheezing [ ] sputum [ ] hemoptysis  GI: [ ] nausea [ ] vomiting [ ] diarrhea [ ] constipation [ ] abd pain [ ] dysphagia   : [ ] dysuria [ ] nocturia [ ] hematuria [ ] increased urinary frequency  Musculoskeletal: [ ] myalgias [ ] arthralgias   Skin: [ ] rash [ ] itch  Neurological: [ ] headache [ ] dizziness [ ] syncope [ ] weakness [ ] numbness  Psychiatric: [ ] anxiety [ ] depression  Endocrine: [ ] diabetes [ ] thyroid problem  Hematologic/Lymphatic: [ ] anemia [ ] bleeding problem  Allergic/Immunologic: [ ] itchy eyes [ ] nasal discharge [ ] hives [ ] angioedema  [x] All other systems negative  [ ] Unable to assess ROS because ________    OBJECTIVE:  ICU Vital Signs Last 24 Hrs  T(C): 36.5 (25 Mar 2020 10:57), Max: 37.3 (25 Mar 2020 02:08)  T(F): 97.7 (25 Mar 2020 10:57), Max: 99.1 (25 Mar 2020 02:08)  HR: 94 (25 Mar 2020 10:57) (80 - 120)  BP: 145/92 (25 Mar 2020 10:57) (135/80 - 155/80)  BP(mean): --  ABP: --  ABP(mean): --  RR: 21 (25 Mar 2020 10:57) (21 - 38)  SpO2: 98% (25 Mar 2020 10:57) (95% - 100%)        03-24 @ 07:01  -  03-25 @ 07:00  --------------------------------------------------------  IN: 240 mL / OUT: 355 mL / NET: -115 mL      CAPILLARY BLOOD GLUCOSE          PHYSICAL EXAM:  General:  NAD  HEENT:  NC/AT  Lymph Nodes: No cervical or supraclavicular lymphadenopathy   Neck: Supple  Respiratory:  CTA B/L, no wheezes, crackles or rhonchi  Cardiovascular:  RRR, no m/r/g  Abdomen: soft, NT/ND, +BS  Extremities: no clubbing, cyanosis or edema, warm  Skin: no rash  Neurological: AAOx3, no focal deficits  Psychiatry: not anxious, normal affect    HOSPITAL MEDICATIONS:  MEDICATIONS  (STANDING):  ALBUTerol    90 MICROgram(s) HFA Inhaler 2 Puff(s) Inhalation every 6 hours  azithromycin   Tablet 500 milliGRAM(s) Oral daily  benzonatate 100 milliGRAM(s) Oral three times a day  buDESOnide    Inhalation Suspension 0.5 milliGRAM(s) Inhalation daily  cholecalciferol 1000 Unit(s) Oral daily  enoxaparin Injectable 40 milliGRAM(s) SubCutaneous daily  hydroxychloroquine 200 milliGRAM(s) Oral every 12 hours  ipratropium 17 MICROgram(s) HFA Inhaler 1 Puff(s) Inhalation every 6 hours  predniSONE   Tablet 5 milliGRAM(s) Oral daily    MEDICATIONS  (PRN):      LABS:                        11.5   12.79 )-----------( 264      ( 24 Mar 2020 11:06 )             35.8     Hgb Trend: 11.5<--  03-24    137  |  98  |  14  ----------------------------<  92  3.6   |  27  |  0.77    Ca    8.6      24 Mar 2020 11:06  Phos  3.2     03-24  Mg     2.2     03-24    TPro  7.9  /  Alb  3.5  /  TBili  0.7  /  DBili  x   /  AST  50<H>  /  ALT  42<H>  /  AlkPhos  78  03-24    Creatinine Trend: 0.77<--  PT/INR - ( 24 Mar 2020 11:06 )   PT: 14.1 SEC;   INR: 1.22          PTT - ( 24 Mar 2020 11:06 )  PTT:25.4 SEC      Venous Blood Gas:  03-24 @ 11:06  7.40/51/27/28/36.0  VBG Lactate: 1.2      MICROBIOLOGY:     RADIOLOGY:  [ ] Reviewed and interpreted by me    PULMONARY FUNCTION TESTS:    EKG: [facility] - Division of Pulmonary, Critical Care and Sleep Medicine   Please call [phone] between 8am-pm weekdays, [phone] after hours and weekends      CHIEF COMPLAINT: Shortness of breath x 2 weeks    HPI:  53 yo F h/o sarcoidosis with lung and eye involvement p/w progressive dyspnea and cough x 2 weeks.  Pt had previously gone to  to test for COVID-19; she received confirmation tonight that her test was positive.  Sarcoidosis was dx 2017 on lung biopsy and she being treated with methotrexate 10 mg weekly, chronic prednisone, and hydroxychloroquine, follows with Dr. Amos Page of [facility]. Five days prior to presentation, she presented to [facility] w/ similar sxs and was given a Zpack and Prednisone 20mg x4days.  Symptoms did not improve and presented to our ED.  She denies hx of fever, chills, N/V/D/C, abd pain.    Dr. Page had reduced her maintenance Prednisone to 5mg QD about 3 weeks prior to presentation.  Previous attempts to reduce her steroids had resulted in flares c/b left eye pain and blurry vision.    In the ED, pt was afebrile 97.6, tachycardic 120, -178/, tachypneic to 38; sating 91% on 3L NC moved to 96% on 6L NC.  CXR shows b/l LL opacities.  Given Epinephrine 0.3mg, Levaquin, Solumedrol 40, and 1L NS.    Currently lying in bed, states her dyspnea is mild as long as she doesn't move.     PAST MEDICAL & SURGICAL HISTORY:  Sarcoidosis: dx 2017      FAMILY HISTORY:  FH: kidney failure: father      SOCIAL HISTORY:  Smoking: [ x] Never Smoked [ ] Former Smoker (__ packs x ___ years) [ ] Current Smoker  (__ packs x ___ years)  Substance Use: [ x] Never Used [ ] Used ____  EtOH Use: denies    Allergies  No Known Allergies    HOME MEDICATIONS: reviewed    REVIEW OF SYSTEMS:  Constitutional: [ ] fevers [ ] chills [ ] weight loss [ ] weight gain  HEENT: [ ] dry eyes [ ] eye irritation [ ] postnasal drip [ ] nasal congestion  CV: [ ] chest pain [ ] orthopnea [ ] palpitations [ ] murmur  Resp: [x ] cough [x] shortness of breath [ ] wheezing [ x] sputum [ ] hemoptysis  GI: [ ] nausea [ ] vomiting [ ] diarrhea [ ] constipation [ ] abd pain [ ] dysphagia   : [ ] dysuria [ ] nocturia [ ] hematuria [ ] increased urinary frequency  Musculoskeletal: [ ] myalgias [ ] arthralgias   Skin: [ ] rash [ ] itch  Neurological: [ ] headache [ ] dizziness [ ] syncope [ ] weakness [ ] numbness  Psychiatric: [ ] anxiety [ ] depression  Endocrine: [ ] diabetes [ ] thyroid problem  Hematologic/Lymphatic: [ ] anemia [ ] bleeding problem  Allergic/Immunologic: [ ] itchy eyes [ ] nasal discharge [ ] hives [ ] angioedema  [x] All other systems negative  [ ] Unable to assess ROS because ________    OBJECTIVE:  ICU Vital Signs Last 24 Hrs  T(C): 36.5 (25 Mar 2020 10:57), Max: 37.3 (25 Mar 2020 02:08)  T(F): 97.7 (25 Mar 2020 10:57), Max: 99.1 (25 Mar 2020 02:08)  HR: 94 (25 Mar 2020 10:57) (80 - 120)  BP: 145/92 (25 Mar 2020 10:57) (135/80 - 155/80)  BP(mean): --  ABP: --  ABP(mean): --  RR: 21 (25 Mar 2020 10:57) (21 - 38)  SpO2: 98% (25 Mar 2020 10:57) (95% - 100%)        03-24 @ 07:01  -  03-25 @ 07:00  --------------------------------------------------------  IN: 240 mL / OUT: 355 mL / NET: -115 mL      CAPILLARY BLOOD GLUCOSE          PHYSICAL EXAM:  General:  NAD  HEENT:  NC/AT  Lymph Nodes: No cervical or supraclavicular lymphadenopathy   Neck: Supple  Respiratory:  CTA B/L, no wheezes, crackles or rhonchi  Cardiovascular:  RRR, no m/r/g  Abdomen: soft, NT/ND, +BS  Extremities: no clubbing, cyanosis or edema, warm  Skin: no rash  Neurological: AAOx3, no focal deficits  Psychiatry: not anxious, normal affect    HOSPITAL MEDICATIONS:  MEDICATIONS  (STANDING):  ALBUTerol    90 MICROgram(s) HFA Inhaler 2 Puff(s) Inhalation every 6 hours  azithromycin   Tablet 500 milliGRAM(s) Oral daily  benzonatate 100 milliGRAM(s) Oral three times a day  buDESOnide    Inhalation Suspension 0.5 milliGRAM(s) Inhalation daily  cholecalciferol 1000 Unit(s) Oral daily  enoxaparin Injectable 40 milliGRAM(s) SubCutaneous daily  hydroxychloroquine 200 milliGRAM(s) Oral every 12 hours  ipratropium 17 MICROgram(s) HFA Inhaler 1 Puff(s) Inhalation every 6 hours  predniSONE   Tablet 5 milliGRAM(s) Oral daily    MEDICATIONS  (PRN):      LABS:                        11.5   12.79 )-----------( 264      ( 24 Mar 2020 11:06 )             35.8     Hgb Trend: 11.5<--  03-24    137  |  98  |  14  ----------------------------<  92  3.6   |  27  |  0.77    Ca    8.6      24 Mar 2020 11:06  Phos  3.2     03-24  Mg     2.2     03-24    TPro  7.9  /  Alb  3.5  /  TBili  0.7  /  DBili  x   /  AST  50<H>  /  ALT  42<H>  /  AlkPhos  78  03-24    Creatinine Trend: 0.77<--  PT/INR - ( 24 Mar 2020 11:06 )   PT: 14.1 SEC;   INR: 1.22          PTT - ( 24 Mar 2020 11:06 )  PTT:25.4 SEC      Venous Blood Gas:  03-24 @ 11:06  7.40/51/27/28/36.0  VBG Lactate: 1.2      MICROBIOLOGY: pending    RADIOLOGY:  [ x] Reviewed and interpreted by me  < from: Xray Chest 1 View-PORTABLE IMMEDIATE (03.24.20 @ 11:06) >  EXAM:  XR CHEST PORTABLE IMMED 1V        PROCEDURE DATE:  Mar 24 2020     INTERPRETATION:  Indication: Shortness of breath.    Technique: Single upright portable view of the chest.    Comparison: None.    Findings: The cardiac silhouette is normal in size. There are small airspace opacities in the bilateral lower lobes. The hilar and mediastinal structures appear unremarkable. The visualized osseous structures are intact.    Impression: Multifocal pneumonia involving the bilateral lower lobes.    SHANNAN GOTTI M.D., ATTENDING RADIOLOGIST  This document has been electronically signed. Mar 24 2020 11:01AM    < end of copied text >

## 2020-03-25 NOTE — PROGRESS NOTE ADULT - PROBLEM SELECTOR PLAN 3
dx in 2017 via lung bx on maintenance mtx and hydroxychloroquine; prednisone reduced recently to 5mg PO QD  - will hold MTX in setting of infection  - c/w home Plaquenil  - c/w home Prednisone dx in 2017 via lung bx on maintenance mtx and hydroxychloroquine; prednisone reduced recently to 5mg PO QD  - per Pulm, can c/w MTX weekly  - start Plaquenil at COVID-19 reg, can resume Plaquenil for sarcoid afterwards  - c/w home Prednisone dx in 2017 via lung bx on maintenance mtx and hydroxychloroquine; prednisone reduced recently to 5mg PO QD  - per Pulm, can c/w MTX weekly  - start Plaquenil at COVID-19 reg, can resume Plaquenil for sarcoid afterwards  - c/w home Prednisone  - spoke w/ Dr. Page at 16:45 today to obtain additional pt hx.  Pt has mild obstructive dx on mtx qweekly and plaquenil.  Per Dr. Page, pt was not on Prednisone the last time she saw him álvaro

## 2020-03-25 NOTE — PROGRESS NOTE ADULT - PROBLEM SELECTOR PLAN 5
Transitions of Care Status:  1.  Name of PCP: Dr. Kyle Conner, [phone],  2.  PCP Contacted on Admission: [x] Y    [ ] N   --- left VM w/ staff  2.1 Pulmonology Dr. Page's office contacted on admission, office closed, will try to reach out again tomorrow when office opens.   3.  PCP contacted at Discharge: [ ] Y    [ ] N    [ ] N/A  4.  Post-Discharge Appointment Date and Location:  5.  Summary of Handoff given to PCP: Transitions of Care Status:  1.  Name of PCP: Dr. Kyle Conner, [phone],  2.  PCP Contacted on Admission: [x] Y    [ ] N   --- left  w/ staff  2.1 Pulmonology Dr. Page's office contacted today, update left w/ staff Francoise; my personal contact also left w/ office  3.  PCP contacted at Discharge: [ ] Y    [ ] N    [ ] N/A  4.  Post-Discharge Appointment Date and Location:  5.  Summary of Handoff given to PCP:

## 2020-03-25 NOTE — PROGRESS NOTE ADULT - PROBLEM SELECTOR PLAN 2
p/w 2 wk hx of productive cough and worsening SOB, found to have b/l lower lobe opacities  - RVP neg  - tested positive for COVID-19 at outside facility, pending COVID-19 PCR here  - previously given solumedrol 40, will c/w home Prednisone 5mg PO QD  - currently sating mid-90s on 6L NC, not on home O2 at baseline  - goal O2 sat low to mid-90s given lung hx, can escalate to NRB; will c/s MICU for sharply increasing O2 demand or increased WOB  - previously given Zpack; start azithromycin 500 PO QD  - daily EKG  - replete Mg to 3 p/w 2 wk hx of productive cough and worsening SOB, found to have b/l lower lobe opacities  - COVID-19 positive   - RVP neg  - previously given solumedrol 40, will c/w home Prednisone 5mg PO QD  - currently sating mid-90s on 6L NC, not on home O2 at baseline  - goal O2 sat low to mid-90s given lung hx, can escalate to NRB; will c/s MICU for sharply increasing O2 demand or increased WOB  - previously given Zpack; c/w azithromycin 500 PO QD  - start Plaquenil, maintenance dose x5 days  - daily EKG  - replete Mg to 3 p/w 2 wk hx of productive cough and worsening SOB, found to have b/l lower lobe opacities  - COVID-19 positive   - RVP neg  - previously given solumedrol 40, will c/w home Prednisone 5mg PO QD  - currently sating mid-90s on 6L NC, not on home O2 at baseline  - goal O2 sat low to mid-90s given lung hx, can escalate to NRB; will c/s MICU for sharply increasing O2 demand or increased WOB  - previously given Zpack; c/w azithromycin 500 PO QD  - start Plaquenil, maintenance dose x5 days  - tessalon perles for cough  - daily EKG  - replete Mg to 3

## 2020-03-25 NOTE — CONSULT NOTE ADULT - ASSESSMENT
55 yo F h/o sarcoidosis with lung and eye involvement p/w progressive dyspnea and cough x 2 weeks.  Pt had previously gone to  to test for COVID-19; she received confirmation tonight that her test was positive.  Sarcoidosis was dx 2017 on lung biopsy and she being treated with methotrexate 10 mg weekly, chronic prednisone, and hydroxychloroquine, follows with Dr. Amos Page of [facility]. Five days prior to presentation, she presented to [facility] w/ similar sxs and was given a Zpack and Prednisone 20mg x4days.  Symptoms did not improve and presented to our ED.  She denies hx of fever, chills, N/V/D/C, abd pain.    Dr. Page had reduced her maintenance Prednisone to 5mg QD about 3 weeks prior to presentation.  Previous attempts to reduce her steroids had resulted in flares c/b left eye pain and blurry vision.    In the ED, pt was afebrile 97.6, tachycardic 120, -178/, tachypneic to 38; sating 91% on 3L NC moved to 96% on 6L NC.  CXR shows b/l LL opacities.  Given Epinephrine 0.3mg, Levaquin, Solumedrol 40, and 1L NS.    Currently lying in bed, states her dyspnea is mild as long as she doesn't move.     A/P: COVID+  c/w Plaquenil, Azithro  Daily EKGs to monitor QTc  Supplemental O2, goal sats 92-96%    Sarcoidosis - stable, would c/w current regimen: Methotrexate weekly, Plaquenil, Prednisone 5 mg QD  Bronchodilators, Budesonide MDI  Monitor for vision changes    DVT ppx

## 2020-03-25 NOTE — PROGRESS NOTE ADULT - SUBJECTIVE AND OBJECTIVE BOX
Guille Benson MD, PGY1  Internal Medicine  [facility]: [phone] / [facility]: [id number]    Patient is a 54y old  Female who presents with a chief complaint of SOB, COVID-19 r/o (24 Mar 2020 16:09)    OVERNIGHT EVENTS: no acute issue o/n;    SUBJECTIVE:    focused ROS as above    MEDICATIONS  (STANDING):  ALBUTerol    90 MICROgram(s) HFA Inhaler 2 Puff(s) Inhalation every 6 hours  buDESOnide    Inhalation Suspension 0.5 milliGRAM(s) Inhalation daily  cholecalciferol 1000 Unit(s) Oral daily  enoxaparin Injectable 40 milliGRAM(s) SubCutaneous daily  ipratropium 17 MICROgram(s) HFA Inhaler 1 Puff(s) Inhalation every 6 hours  predniSONE   Tablet 5 milliGRAM(s) Oral daily    MEDICATIONS  (PRN):    OBJECTIVE:  T(C): 37.3 (03-25-20 @ 02:08), Max: 37.3 (03-25-20 @ 02:08)  HR: 92 (03-25-20 @ 02:08) (80 - 120)  BP: 138/77 (03-25-20 @ 02:08) (135/80 - 178/104)  RR: 23 (03-25-20 @ 02:08) (23 - 38)  SpO2: 100% (03-25-20 @ 02:08) (91% - 100%)    PHYSICAL EXAM  GENERAL: NAD   HEAD:  Atraumatic, normocephalic  EYES: PERRLA, sclera clear  CHEST/LUNG: Clear to auscultation bilaterally; no wheeze  HEART: RRR; S1, S2; no M/R/G  ABDOMEN: soft, non-distended; non-tender; BS present  EXTREMITIES:  2+ Peripheral Pulses; no edema  NEURO: non-focal  PSYCH: appropriate affect  SKIN: No rashes or lesions    LABS:  CAPILLARY BLOOD GLUCOSE    I&O's Summary                          11.5   12.79 )-----------( 264      ( 24 Mar 2020 11:06 )             35.8     03-24    137  |  98  |  14  ----------------------------<  92  3.6   |  27  |  0.77    Ca    8.6      24 Mar 2020 11:06  Phos  3.2     03-24  Mg     2.2     03-24    TPro  7.9  /  Alb  3.5  /  TBili  0.7  /  DBili  x   /  AST  50<H>  /  ALT  42<H>  /  AlkPhos  78  03-24    PT/INR - ( 24 Mar 2020 11:06 )   PT: 14.1 SEC;   INR: 1.22          PTT - ( 24 Mar 2020 11:06 )  PTT:25.4 SEC  CARDIAC MARKERS ( 24 Mar 2020 11:06 )  x     / x     / 288 u/L / 2.14 ng/mL / x          Microbiology:    RADIOLOGY & ADDITIONAL TESTS:    Imaging Personally Reviewed:  Consultant(s) Notes Reviewed:    Care Discussed with Consultants/Other Providers: Guille Benson MD, PGY1  Internal Medicine  [facility]: [phone] / [facility]: [id number]    Patient is a 54y old  Female who presents with a chief complaint of SOB, COVID-19 r/o (24 Mar 2020 16:09)    OVERNIGHT EVENTS: long episodes of cough o/n    SUBJECTIVE: worried this morning that she's dependent on O2, also worried that Prednisone 5 is too low to manage her sarcoid; denies F/C, N/V/D/C, SOB    focused ROS as above    MEDICATIONS  (STANDING):  ALBUTerol    90 MICROgram(s) HFA Inhaler 2 Puff(s) Inhalation every 6 hours  buDESOnide    Inhalation Suspension 0.5 milliGRAM(s) Inhalation daily  cholecalciferol 1000 Unit(s) Oral daily  enoxaparin Injectable 40 milliGRAM(s) SubCutaneous daily  ipratropium 17 MICROgram(s) HFA Inhaler 1 Puff(s) Inhalation every 6 hours  predniSONE   Tablet 5 milliGRAM(s) Oral daily    MEDICATIONS  (PRN):    OBJECTIVE:  T(C): 37.3 (03-25-20 @ 02:08), Max: 37.3 (03-25-20 @ 02:08)  HR: 92 (03-25-20 @ 02:08) (80 - 120)  BP: 138/77 (03-25-20 @ 02:08) (135/80 - 178/104)  RR: 23 (03-25-20 @ 02:08) (23 - 38)  SpO2: 100% (03-25-20 @ 02:08) (91% - 100%)    PHYSICAL EXAM  GENERAL: resting in bed, on NC, regulator broken so unable to determine O2 level at the time; NAD   HEAD:  Atraumatic, normocephalic  EYES: PERRLA, sclera clear  CHEST/LUNG: Difficult to appreciate due to obese body habitus, but mild crackles in lung bases; no wheeze (improved from yesterday)  HEART: RRR; S1, S2; no M/R/G  ABDOMEN: soft, non-distended; non-tender; BS present  EXTREMITIES:  2+ Peripheral Pulses; no edema  NEURO: non-focal; AAOx3  SKIN: No rashes or lesions    LABS:  CAPILLARY BLOOD GLUCOSE    I&O's Summary                11.5   12.79 )-----------( 264      ( 24 Mar 2020 11:06 )             35.8     03-24    137  |  98  |  14  ----------------------------<  92  3.6   |  27  |  0.77    Ca    8.6      24 Mar 2020 11:06  Phos  3.2     03-24  Mg     2.2     03-24    TPro  7.9  /  Alb  3.5  /  TBili  0.7  /  DBili  x   /  AST  50<H>  /  ALT  42<H>  /  AlkPhos  78  03-24    Microbiology:    COVID-19 PCR . (03.24.20 @ 15:22)    COVID-19 PCR: Detected:     RADIOLOGY & ADDITIONAL TESTS:    Imaging Personally Reviewed:  Consultant(s) Notes Reviewed:    Care Discussed with Consultants/Other Providers:

## 2020-03-26 LAB
ALBUMIN SERPL ELPH-MCNC: 3.1 G/DL — LOW (ref 3.3–5)
ALP SERPL-CCNC: 69 U/L — SIGNIFICANT CHANGE UP (ref 40–120)
ALT FLD-CCNC: 41 U/L — HIGH (ref 4–33)
ANION GAP SERPL CALC-SCNC: 11 MMO/L — SIGNIFICANT CHANGE UP (ref 7–14)
AST SERPL-CCNC: 41 U/L — HIGH (ref 4–32)
BASOPHILS # BLD AUTO: 0.04 K/UL — SIGNIFICANT CHANGE UP (ref 0–0.2)
BASOPHILS NFR BLD AUTO: 0.5 % — SIGNIFICANT CHANGE UP (ref 0–2)
BILIRUB SERPL-MCNC: 1.3 MG/DL — HIGH (ref 0.2–1.2)
BUN SERPL-MCNC: 11 MG/DL — SIGNIFICANT CHANGE UP (ref 7–23)
CALCIUM SERPL-MCNC: 9.1 MG/DL — SIGNIFICANT CHANGE UP (ref 8.4–10.5)
CHLORIDE SERPL-SCNC: 97 MMOL/L — LOW (ref 98–107)
CO2 SERPL-SCNC: 27 MMOL/L — SIGNIFICANT CHANGE UP (ref 22–31)
CREAT SERPL-MCNC: 0.78 MG/DL — SIGNIFICANT CHANGE UP (ref 0.5–1.3)
CRP SERPL-MCNC: 137.3 MG/L — HIGH
EOSINOPHIL # BLD AUTO: 0.03 K/UL — SIGNIFICANT CHANGE UP (ref 0–0.5)
EOSINOPHIL NFR BLD AUTO: 0.3 % — SIGNIFICANT CHANGE UP (ref 0–6)
GLUCOSE SERPL-MCNC: 80 MG/DL — SIGNIFICANT CHANGE UP (ref 70–99)
HCT VFR BLD CALC: 33.7 % — LOW (ref 34.5–45)
HGB BLD-MCNC: 10.9 G/DL — LOW (ref 11.5–15.5)
IMM GRANULOCYTES NFR BLD AUTO: 2.4 % — HIGH (ref 0–1.5)
LYMPHOCYTES # BLD AUTO: 0.21 K/UL — LOW (ref 1–3.3)
LYMPHOCYTES # BLD AUTO: 2.4 % — LOW (ref 13–44)
MAGNESIUM SERPL-MCNC: 2.3 MG/DL — SIGNIFICANT CHANGE UP (ref 1.6–2.6)
MCHC RBC-ENTMCNC: 30.8 PG — SIGNIFICANT CHANGE UP (ref 27–34)
MCHC RBC-ENTMCNC: 32.3 % — SIGNIFICANT CHANGE UP (ref 32–36)
MCV RBC AUTO: 95.2 FL — SIGNIFICANT CHANGE UP (ref 80–100)
MONOCYTES # BLD AUTO: 0.31 K/UL — SIGNIFICANT CHANGE UP (ref 0–0.9)
MONOCYTES NFR BLD AUTO: 3.5 % — SIGNIFICANT CHANGE UP (ref 2–14)
NEUTROPHILS # BLD AUTO: 8 K/UL — HIGH (ref 1.8–7.4)
NEUTROPHILS NFR BLD AUTO: 90.9 % — HIGH (ref 43–77)
NRBC # FLD: 0.03 K/UL — SIGNIFICANT CHANGE UP (ref 0–0)
PHOSPHATE SERPL-MCNC: 2.8 MG/DL — SIGNIFICANT CHANGE UP (ref 2.5–4.5)
PLATELET # BLD AUTO: 276 K/UL — SIGNIFICANT CHANGE UP (ref 150–400)
PMV BLD: 11.2 FL — SIGNIFICANT CHANGE UP (ref 7–13)
POTASSIUM SERPL-MCNC: 3.7 MMOL/L — SIGNIFICANT CHANGE UP (ref 3.5–5.3)
POTASSIUM SERPL-SCNC: 3.7 MMOL/L — SIGNIFICANT CHANGE UP (ref 3.5–5.3)
PROT SERPL-MCNC: 7.1 G/DL — SIGNIFICANT CHANGE UP (ref 6–8.3)
RBC # BLD: 3.54 M/UL — LOW (ref 3.8–5.2)
RBC # FLD: 12.8 % — SIGNIFICANT CHANGE UP (ref 10.3–14.5)
SODIUM SERPL-SCNC: 135 MMOL/L — SIGNIFICANT CHANGE UP (ref 135–145)
WBC # BLD: 8.8 K/UL — SIGNIFICANT CHANGE UP (ref 3.8–10.5)
WBC # FLD AUTO: 8.8 K/UL — SIGNIFICANT CHANGE UP (ref 3.8–10.5)

## 2020-03-26 PROCEDURE — 99233 SBSQ HOSP IP/OBS HIGH 50: CPT | Mod: GC

## 2020-03-26 RX ORDER — BUDESONIDE AND FORMOTEROL FUMARATE DIHYDRATE 160; 4.5 UG/1; UG/1
2 AEROSOL RESPIRATORY (INHALATION)
Refills: 0 | Status: DISCONTINUED | OUTPATIENT
Start: 2020-03-26 | End: 2020-04-06

## 2020-03-26 RX ADMIN — Medication 1 PUFF(S): at 05:47

## 2020-03-26 RX ADMIN — ALBUTEROL 2 PUFF(S): 90 AEROSOL, METERED ORAL at 17:07

## 2020-03-26 RX ADMIN — ALBUTEROL 2 PUFF(S): 90 AEROSOL, METERED ORAL at 05:47

## 2020-03-26 RX ADMIN — Medication 5 MILLIGRAM(S): at 05:47

## 2020-03-26 RX ADMIN — Medication 100 MILLIGRAM(S): at 17:07

## 2020-03-26 RX ADMIN — ALBUTEROL 2 PUFF(S): 90 AEROSOL, METERED ORAL at 13:00

## 2020-03-26 RX ADMIN — Medication 1000 UNIT(S): at 13:00

## 2020-03-26 RX ADMIN — Medication 200 MILLIGRAM(S): at 05:47

## 2020-03-26 RX ADMIN — AZITHROMYCIN 500 MILLIGRAM(S): 500 TABLET, FILM COATED ORAL at 12:59

## 2020-03-26 RX ADMIN — Medication 100 MILLIGRAM(S): at 12:59

## 2020-03-26 RX ADMIN — Medication 1 PUFF(S): at 17:07

## 2020-03-26 RX ADMIN — Medication 100 MILLIGRAM(S): at 22:56

## 2020-03-26 RX ADMIN — Medication 1 PUFF(S): at 13:00

## 2020-03-26 RX ADMIN — Medication 200 MILLIGRAM(S): at 17:08

## 2020-03-26 RX ADMIN — ENOXAPARIN SODIUM 40 MILLIGRAM(S): 100 INJECTION SUBCUTANEOUS at 12:59

## 2020-03-26 RX ADMIN — BUDESONIDE AND FORMOTEROL FUMARATE DIHYDRATE 2 PUFF(S): 160; 4.5 AEROSOL RESPIRATORY (INHALATION) at 22:56

## 2020-03-26 RX ADMIN — Medication 100 MILLIGRAM(S): at 05:47

## 2020-03-26 NOTE — PROGRESS NOTE ADULT - PROBLEM SELECTOR PLAN 5
Transitions of Care Status:  1.  Name of PCP: Dr. Kyle Conner, [phone],  2.  PCP Contacted on Admission: [x] Y    [ ] N   --- left  w/ staff  2.1 Pulmonology Dr. Page's office contacted today, update left w/ staff Francoise; my personal contact also left w/ office  3.  PCP contacted at Discharge: [ ] Y    [ ] N    [ ] N/A  4.  Post-Discharge Appointment Date and Location:  5.  Summary of Handoff given to PCP:

## 2020-03-26 NOTE — PROGRESS NOTE ADULT - PROBLEM SELECTOR PLAN 3
dx in 2017 via lung bx on maintenance mtx and hydroxychloroquine; prednisone reduced recently to 5mg PO QD  - per Pulm, can c/w MTX weekly  - start Plaquenil at COVID-19 reg, can resume Plaquenil for sarcoid afterwards  - c/w home Prednisone  - as per o/p PULM Dr. Page  to obtain additional pt hx.  Pt has mild obstructive dx on mtx qweekly and plaquenil.  Per Dr. Page, pt was not on Prednisone the last time she saw him álvaro

## 2020-03-26 NOTE — PROGRESS NOTE ADULT - PROBLEM SELECTOR PLAN 2
resolved  p/w tachycardia, tachypnea, and leukocytosis 2/2 COVID-19 infection  - see COVID-19 infection below

## 2020-03-26 NOTE — PROGRESS NOTE ADULT - SUBJECTIVE AND OBJECTIVE BOX
PROGRESS NOTE:     CONTACT INFO:  Shubham Laird MD  Internal Medicine PGY3  Pager: [phone] [place]/ [id number] [facility]    Patient is a 54y old  Female who presents with a chief complaint of SOB, COVID-19 r/o (25 Mar 2020 12:21)      SUBJECTIVE / OVERNIGHT EVENTS:  No acute events overnight. Patient seen and evaluated at bedside. No fever/chills.  Denies SOB at rest, chest pain, palpitations, abdominal pain, nausea/vomiting    ADDITIONAL REVIEW OF SYSTEMS:    MEDICATIONS  (STANDING):  ALBUTerol    90 MICROgram(s) HFA Inhaler 2 Puff(s) Inhalation every 6 hours  azithromycin   Tablet 500 milliGRAM(s) Oral daily  benzonatate 100 milliGRAM(s) Oral three times a day  buDESOnide    Inhalation Suspension 0.5 milliGRAM(s) Inhalation daily  cholecalciferol 1000 Unit(s) Oral daily  enoxaparin Injectable 40 milliGRAM(s) SubCutaneous daily  hydroxychloroquine 200 milliGRAM(s) Oral every 12 hours  ipratropium 17 MICROgram(s) HFA Inhaler 1 Puff(s) Inhalation every 6 hours  methotrexate 10 milliGRAM(s) Oral every week  predniSONE   Tablet 5 milliGRAM(s) Oral daily    MEDICATIONS  (PRN):      CAPILLARY BLOOD GLUCOSE        I&O's Summary    25 Mar 2020 07:01  -  26 Mar 2020 07:00  --------------------------------------------------------  IN: 0 mL / OUT: 300 mL / NET: -300 mL    26 Mar 2020 07:01  -  26 Mar 2020 12:05  --------------------------------------------------------  IN: 0 mL / OUT: 200 mL / NET: -200 mL        PHYSICAL EXAM:  Vital Signs Last 24 Hrs  T(C): 36.9 (26 Mar 2020 10:05), Max: 37.7 (25 Mar 2020 17:35)  T(F): 98.5 (26 Mar 2020 10:05), Max: 99.8 (25 Mar 2020 17:35)  HR: 94 (26 Mar 2020 10:05) (94 - 101)  BP: 129/97 (26 Mar 2020 10:05) (116/56 - 139/83)  BP(mean): --  RR: 17 (26 Mar 2020 10:05) (16 - 21)  SpO2: 100% (26 Mar 2020 10:05) (92% - 100%)    CONSTITUTIONAL: NAD, well-developed  RESPIRATORY: Normal respiratory effort; lungs are clear to auscultation bilaterally  CARDIOVASCULAR: Regular rate and rhythm, normal S1 and S2, no murmur/rub/gallop; No lower extremity edema; Peripheral pulses are 2+ bilaterally  ABDOMEN: Nontender to palpation, normoactive bowel sounds, no rebound/guarding; No hepatosplenomegaly  MUSCLOSKELETAL: no clubbing or cyanosis of digits; no joint swelling or tenderness to palpation  PSYCH: A+O to person, place, and time; affect appropriate    LABS:                        10.9   8.80  )-----------( 276      ( 26 Mar 2020 05:50 )             33.7     03-26    135  |  97<L>  |  11  ----------------------------<  80  3.7   |  27  |  0.78    Ca    9.1      26 Mar 2020 05:50  Phos  2.8     03-26  Mg     2.3     03-26    TPro  7.1  /  Alb  3.1<L>  /  TBili  1.3<H>  /  DBili  x   /  AST  41<H>  /  ALT  41<H>  /  AlkPhos  69  03-26              Culture - Blood (collected 24 Mar 2020 14:37)  Source: .Blood Blood-Venous  Preliminary Report (25 Mar 2020 15:05):    No growth to date.    Culture - Blood (collected 24 Mar 2020 14:37)  Source: .Blood Blood-Peripheral  Preliminary Report (25 Mar 2020 15:05):    No growth to date.        RADIOLOGY & ADDITIONAL TESTS:  Results Reviewed:   Imaging Personally Reviewed:  Electrocardiogram Personally Reviewed:    COORDINATION OF CARE:  Care Discussed with Consultants/Other Providers [Y/N]:  Prior or Outpatient Records Reviewed [Y/N]: PROGRESS NOTE:     CONTACT INFO:  Shubham Laird MD  Internal Medicine PGY3  Pager: [phone] [place]/ [id number] [facility]    Patient is a 54y old  Female who presents with a chief complaint of SOB, COVID-19 r/o (25 Mar 2020 12:21)      SUBJECTIVE / OVERNIGHT EVENTS:  No acute events overnight. Endorses some white productive cough. Patient seen and evaluated at bedside. No fever/chills. Engineering unable to fix oxygen volume controller on wall, Unable to determine actual oxygen delivered. Pt believes it 6L because she felt the same in the ED.  Tolerated MTX, HCQ, prednisone. Denies SOB at rest, chest pain, palpitations, abdominal pain, nausea/vomiting    ADDITIONAL REVIEW OF SYSTEMS:    MEDICATIONS  (STANDING):  ALBUTerol    90 MICROgram(s) HFA Inhaler 2 Puff(s) Inhalation every 6 hours  azithromycin   Tablet 500 milliGRAM(s) Oral daily  benzonatate 100 milliGRAM(s) Oral three times a day  buDESOnide    Inhalation Suspension 0.5 milliGRAM(s) Inhalation daily  cholecalciferol 1000 Unit(s) Oral daily  enoxaparin Injectable 40 milliGRAM(s) SubCutaneous daily  hydroxychloroquine 200 milliGRAM(s) Oral every 12 hours  ipratropium 17 MICROgram(s) HFA Inhaler 1 Puff(s) Inhalation every 6 hours  methotrexate 10 milliGRAM(s) Oral every week  predniSONE   Tablet 5 milliGRAM(s) Oral daily    MEDICATIONS  (PRN):      CAPILLARY BLOOD GLUCOSE        I&O's Summary    25 Mar 2020 07:01  -  26 Mar 2020 07:00  --------------------------------------------------------  IN: 0 mL / OUT: 300 mL / NET: -300 mL    26 Mar 2020 07:01  -  26 Mar 2020 12:05  --------------------------------------------------------  IN: 0 mL / OUT: 200 mL / NET: -200 mL        PHYSICAL EXAM:  Vital Signs Last 24 Hrs  T(C): 36.9 (26 Mar 2020 10:05), Max: 37.7 (25 Mar 2020 17:35)  T(F): 98.5 (26 Mar 2020 10:05), Max: 99.8 (25 Mar 2020 17:35)  HR: 94 (26 Mar 2020 10:05) (94 - 101)  BP: 129/97 (26 Mar 2020 10:05) (116/56 - 139/83)  BP(mean): --  RR: 17 (26 Mar 2020 10:05) (16 - 21)  SpO2: 100% (26 Mar 2020 10:05) (92% - 100%)    CONSTITUTIONAL: NAD, well-developed  RESPIRATORY: Normal respiratory effort; lungs with right lower base crackles   CARDIOVASCULAR: Regular rate and rhythm, normal S1 and S2, no murmur/rub/gallop; No lower extremity edema; Peripheral pulses are 2+ bilaterally  ABDOMEN: Nontender to palpation, normoactive bowel sounds, no rebound/guarding; No hepatosplenomegaly  MUSCLOSKELETAL: no clubbing or cyanosis of digits; no joint swelling or tenderness to palpation  PSYCH: A+O to person, place, and time; affect appropriate    LABS:                        10.9   8.80  )-----------( 276      ( 26 Mar 2020 05:50 )             33.7     03-26    135  |  97<L>  |  11  ----------------------------<  80  3.7   |  27  |  0.78    Ca    9.1      26 Mar 2020 05:50  Phos  2.8     03-26  Mg     2.3     03-26    TPro  7.1  /  Alb  3.1<L>  /  TBili  1.3<H>  /  DBili  x   /  AST  41<H>  /  ALT  41<H>  /  AlkPhos  69  03-26              Culture - Blood (collected 24 Mar 2020 14:37)  Source: .Blood Blood-Venous  Preliminary Report (25 Mar 2020 15:05):    No growth to date.    Culture - Blood (collected 24 Mar 2020 14:37)  Source: .Blood Blood-Peripheral  Preliminary Report (25 Mar 2020 15:05):    No growth to date.      COVID-19 PCR . (03.24.20 @ 15:22)    COVID-19 PCR: Detected: LDT - Laboratory Developed Test All “detected” results on this new test  are considered presumptively positive results, are clinically actionable,  and specimens will be forwarded to [facility] for confirmation testing.  Another report (corrected report) will only be issued if discordant  results occur.  This test has been validated by [employer] to be accurate;  though it has not been FDA cleared/approved by the usual pathway.  As with all laboratory tests, results should be correlated with clinical  findings.      RADIOLOGY & ADDITIONAL TESTS:  Results Reviewed:   Imaging Personally Reviewed:  Electrocardiogram Personally Reviewed:    COORDINATION OF CARE:  Care Discussed with Consultants/Other Providers [Y/N]:  Prior or Outpatient Records Reviewed [Y/N]:

## 2020-03-26 NOTE — PROGRESS NOTE ADULT - PROBLEM SELECTOR PLAN 1
p/w 2 wk hx of productive cough and worsening SOB, found to have b/l lower lobe opacities. Remains on likely 6L NC without prior home oxygen requirement  - COVID-19 positive   -  c/w home Prednisone 5mg PO QD  - restart home MTX and Plaquenil  - goal O2 sat low to mid-90s given lung hx, can escalate to NRB; will c/s MICU for sharply increasing O2 demand or increased WOB  - previously given Zpack; c/w azithromycin 500 PO QD  - start Plaquenil, maintenance dose x5 days  - tessalon perles for cough  - pulm recs apprecaited

## 2020-03-27 LAB
ANION GAP SERPL CALC-SCNC: 12 MMO/L — SIGNIFICANT CHANGE UP (ref 7–14)
BUN SERPL-MCNC: 12 MG/DL — SIGNIFICANT CHANGE UP (ref 7–23)
CALCIUM SERPL-MCNC: 9.2 MG/DL — SIGNIFICANT CHANGE UP (ref 8.4–10.5)
CHLORIDE SERPL-SCNC: 96 MMOL/L — LOW (ref 98–107)
CO2 SERPL-SCNC: 28 MMOL/L — SIGNIFICANT CHANGE UP (ref 22–31)
CREAT SERPL-MCNC: 0.75 MG/DL — SIGNIFICANT CHANGE UP (ref 0.5–1.3)
GLUCOSE SERPL-MCNC: 84 MG/DL — SIGNIFICANT CHANGE UP (ref 70–99)
HCT VFR BLD CALC: 33.8 % — LOW (ref 34.5–45)
HGB BLD-MCNC: 10.9 G/DL — LOW (ref 11.5–15.5)
MAGNESIUM SERPL-MCNC: 2.2 MG/DL — SIGNIFICANT CHANGE UP (ref 1.6–2.6)
MCHC RBC-ENTMCNC: 30.6 PG — SIGNIFICANT CHANGE UP (ref 27–34)
MCHC RBC-ENTMCNC: 32.2 % — SIGNIFICANT CHANGE UP (ref 32–36)
MCV RBC AUTO: 94.9 FL — SIGNIFICANT CHANGE UP (ref 80–100)
NRBC # FLD: 0 K/UL — SIGNIFICANT CHANGE UP (ref 0–0)
PHOSPHATE SERPL-MCNC: 2.9 MG/DL — SIGNIFICANT CHANGE UP (ref 2.5–4.5)
PLATELET # BLD AUTO: 274 K/UL — SIGNIFICANT CHANGE UP (ref 150–400)
PMV BLD: 11.1 FL — SIGNIFICANT CHANGE UP (ref 7–13)
POTASSIUM SERPL-MCNC: 4.2 MMOL/L — SIGNIFICANT CHANGE UP (ref 3.5–5.3)
POTASSIUM SERPL-SCNC: 4.2 MMOL/L — SIGNIFICANT CHANGE UP (ref 3.5–5.3)
RBC # BLD: 3.56 M/UL — LOW (ref 3.8–5.2)
RBC # FLD: 12.5 % — SIGNIFICANT CHANGE UP (ref 10.3–14.5)
SODIUM SERPL-SCNC: 136 MMOL/L — SIGNIFICANT CHANGE UP (ref 135–145)
WBC # BLD: 7.59 K/UL — SIGNIFICANT CHANGE UP (ref 3.8–10.5)
WBC # FLD AUTO: 7.59 K/UL — SIGNIFICANT CHANGE UP (ref 3.8–10.5)

## 2020-03-27 PROCEDURE — 93010 ELECTROCARDIOGRAM REPORT: CPT

## 2020-03-27 RX ADMIN — ALBUTEROL 2 PUFF(S): 90 AEROSOL, METERED ORAL at 12:02

## 2020-03-27 RX ADMIN — ALBUTEROL 2 PUFF(S): 90 AEROSOL, METERED ORAL at 06:12

## 2020-03-27 RX ADMIN — Medication 1 PUFF(S): at 17:09

## 2020-03-27 RX ADMIN — Medication 1 PUFF(S): at 06:14

## 2020-03-27 RX ADMIN — ALBUTEROL 2 PUFF(S): 90 AEROSOL, METERED ORAL at 17:08

## 2020-03-27 RX ADMIN — Medication 1 PUFF(S): at 00:02

## 2020-03-27 RX ADMIN — ENOXAPARIN SODIUM 40 MILLIGRAM(S): 100 INJECTION SUBCUTANEOUS at 12:02

## 2020-03-27 RX ADMIN — BUDESONIDE AND FORMOTEROL FUMARATE DIHYDRATE 2 PUFF(S): 160; 4.5 AEROSOL RESPIRATORY (INHALATION) at 09:18

## 2020-03-27 RX ADMIN — Medication 100 MILLIGRAM(S): at 12:03

## 2020-03-27 RX ADMIN — ALBUTEROL 2 PUFF(S): 90 AEROSOL, METERED ORAL at 00:02

## 2020-03-27 RX ADMIN — AZITHROMYCIN 500 MILLIGRAM(S): 500 TABLET, FILM COATED ORAL at 12:02

## 2020-03-27 RX ADMIN — Medication 1 PUFF(S): at 23:33

## 2020-03-27 RX ADMIN — Medication 200 MILLIGRAM(S): at 06:12

## 2020-03-27 RX ADMIN — BUDESONIDE AND FORMOTEROL FUMARATE DIHYDRATE 2 PUFF(S): 160; 4.5 AEROSOL RESPIRATORY (INHALATION) at 21:43

## 2020-03-27 RX ADMIN — Medication 200 MILLIGRAM(S): at 17:08

## 2020-03-27 RX ADMIN — Medication 1 PUFF(S): at 12:02

## 2020-03-27 RX ADMIN — Medication 5 MILLIGRAM(S): at 06:12

## 2020-03-27 RX ADMIN — Medication 1000 UNIT(S): at 12:02

## 2020-03-27 RX ADMIN — ALBUTEROL 2 PUFF(S): 90 AEROSOL, METERED ORAL at 23:32

## 2020-03-27 NOTE — PROGRESS NOTE ADULT - PROBLEM SELECTOR PLAN 1
p/w 2 wk hx of productive cough and worsening SOB, found to have b/l lower lobe opacities. Remains on likely 6L NC without prior home oxygen requirement  - COVID-19 positive   -  c/w home Prednisone 5mg PO QD  - restart home MTX and Plaquenil  - goal O2 sat low to mid-90s given lung hx, can escalate to NRB; will c/s MICU for sharply increasing O2 demand or increased WOB  - previously given Zpack; c/w azithromycin 500 PO QD  - start Plaquenil, maintenance dose x5 days  - tessalon perles for cough  - pulm recs apprecaited p/w 2 wk hx of productive cough and worsening SOB, found to have b/l lower lobe opacities. Remains on likely 6L NC without prior home oxygen requirement  - COVID-19 positive   - c/w Plaquenil COVID-19 dosing (3/25- ), will resume home Plaquenil for Sarcoidosis afterwards  - goal O2 sat low to mid-90s given lung hx, can escalate to NRB; will c/s MICU for sharply increasing O2 demand or increased WOB  - previously given Zpack; c/w azithromycin 500 PO QD  - tessalon perles and cepacol for cough  - pulm recs appreciated p/w 2 wk hx of productive cough and worsening SOB, found to have b/l lower lobe opacities. Remains on likely 6L NC without prior home oxygen requirement  - COVID-19 positive   - c/w Plaquenil COVID-19 dosing (3/25- ), will resume home Plaquenil for Sarcoidosis afterwards  - goal O2 sat low to mid-90s given lung hx, can escalate to NRB; will c/s MICU for sharply increasing O2 demand or increased WOB  - previously given Zpack; c/w azithromycin 500 PO QD (3/25- ) x5d  - tessalon perles and cepacol for cough  - pulm recs appreciated p/w 2 wk hx of productive cough and worsening SOB, found to have b/l lower lobe opacities. Remains on likely 6L NC without prior home oxygen requirement  - COVID-19 positive   - c/w Plaquenil COVID-19 dosing (3/25- ), will resume home Plaquenil for Sarcoidosis afterwards  - goal O2 sat low to mid-90s on 6LNC, will try to titrate down today as tolerated  - previously given Zpack; c/w azithromycin 500 PO QD (3/25- ) x5d  - tessalon perles and cepacol for cough  - pulm recs appreciated  - encourage ambulation

## 2020-03-27 NOTE — DISCHARGE NOTE PROVIDER - CARE PROVIDER_API CALL
Dr. Page,   Phone: (   )    -  Fax: (   )    -  Follow Up Time: Kyle Conner  Follow Up Time:     Dr. Page,   Phone: (   )    -  Fax: (   )    -  Follow Up Time:

## 2020-03-27 NOTE — DISCHARGE NOTE PROVIDER - PROVIDER TOKENS
FREE:[LAST:[Dr. Page],PHONE:[(   )    -],FAX:[(   )    -]] PROVIDER:[TOKEN:[[id number]]],FREE:[LAST:[Dr. Page],PHONE:[(   )    -],FAX:[(   )    -]]

## 2020-03-27 NOTE — PROGRESS NOTE ADULT - PROBLEM SELECTOR PLAN 5
Transitions of Care Status:  1.  Name of PCP: Dr. Kyle Conner, [phone],  2.  PCP Contacted on Admission: [x] Y    [ ] N   --- left  w/ staff  2.1 Pulmonology Dr. Page's office contacted today, update left w/ staff Francoise; my personal contact also left w/ office  3.  PCP contacted at Discharge: [ ] Y    [ ] N    [ ] N/A  4.  Post-Discharge Appointment Date and Location:  5.  Summary of Handoff given to PCP: Transitions of Care Status:  1.  Name of PCP: Dr. Kyle Conner, [phone],  2.  PCP Contacted on Admission: [x] Y    [ ] N   --- left  w/ staff  2.1 Direct signout given by me to Pulmonology Dr. Page on 3/25  3.  PCP contacted at Discharge: [ ] Y    [ ] N    [ ] N/A  4.  Post-Discharge Appointment Date and Location:  5.  Summary of Handoff given to PCP: Transitions of Care Status:  1.  Name of PCP: Dr. Kyle Conner, [phone],  2.  PCP Contacted on Admission: [x] Y    [ ] N   --- left  w/ staff  2.1 Direct update given by me to pt's outpt Pulmonologist Dr. Page on 3/25  3.  PCP contacted at Discharge: [ ] Y    [ ] N    [ ] N/A  4.  Post-Discharge Appointment Date and Location:  5.  Summary of Handoff given to PCP:

## 2020-03-27 NOTE — DISCHARGE NOTE PROVIDER - HOSPITAL COURSE
Ms. Nguyen is a 54/F w/ sarcoidosis (dx 2017; on methotrexate and hydroxychloroquine) p/w 2wk hx of cough producing whitish sputum now w/ worsening SOB.  Pt had previously gone to  to test for COVID-19; she received confirmation tonight that her test was positive.  Five days ago, she presented to [facility] w/ similar sxs and was given a Zpack and Prednisone 20mg x4days.  She did not improved, and so presented to our ED.  She denies hx of fever, chills, N/V/D/C, abd pain.  In the ED, pt was afebrile 97.6, tachycardic 120, -178/, tachypneic to 38; sating 91% on 3L NC moved to 96% on 6L NC.  CXR shows b/l LL opacities.  Given Epinephrine 0.3mg, Levoquine, Solumedrol 40, and 1L NS. Pt admitted to general floor medicine for COVID-19 r/o.  On the floor, pt remained on 6L NC sating in the mid-90s. Found to be COVID-19 positive.  Pt had already been on Plaquenil for sarcoidosis, so she was started on the COVID-19 maintenance dose of 200 mg BID x5 days.  Also started on azithromycin.  Pulm consulted.  ID consulted.  Pt continued on abuterol and ipratropium, mtx, and prednisone maintenance.  Pt's outpt Pulmonologist updated. Ms. Nguyen is a 54/F w/ sarcoidosis (dx 2017; on methotrexate and hydroxychloroquine) p/w 2wk hx of cough producing whitish sputum now w/ worsening SOB.  Pt had previously gone to  to test for COVID-19; she received confirmation tonight that her test was positive.  Five days ago, she presented to [facility] w/ similar sxs and was given a Zpack and Prednisone 20mg x4days.  She did not improved, and so presented to our ED.  She denies hx of fever, chills, N/V/D/C, abd pain.  In the ED, pt was afebrile 97.6, tachycardic 120, -178/, tachypneic to 38; sating 91% on 3L NC moved to 96% on 6L NC.  CXR shows b/l LL opacities.  Given Epinephrine 0.3mg, Levoquine, Solumedrol 40, and 1L NS. Pt admitted to general floor medicine for COVID-19 r/o.  On the floor, pt remained on 6L NC sating in the mid-90s. Found to be COVID-19 positive.  Pt had already been on Plaquenil for sarcoidosis, so she was started on the COVID-19 maintenance dose of 200 mg BID x5 days.  Also started on azithromycin.  Pulm consulted.  ID consulted.  Pt continued on abuterol and ipratropium, mtx, and prednisone maintenance.  Pt's outpt Pulmonologist updated.          Dispo: home         On_________, discussed with __________, patient is medically cleared and optimized for discharge today. All medications were reviewed with attending, and sent to mutually agreed upon pharmacy. Ms. Nguyen is a 54/F w/ sarcoidosis (dx 2017; on methotrexate and hydroxychloroquine) p/w 2wk hx of cough producing whitish sputum now w/ worsening SOB.  Pt had previously gone to  to test for COVID-19; she received confirmation tonight that her test was positive.  Five days ago, she presented to [facility] w/ similar sxs and was given a Zpack and Prednisone 20mg x4days.  She did not improved, and so presented to our ED.  She denies hx of fever, chills, N/V/D/C, abd pain.  In the ED, pt was afebrile 97.6, tachycardic 120, -178/, tachypneic to 38; sating 91% on 3L NC moved to 96% on 6L NC.  CXR shows b/l LL opacities.  Given Epinephrine 0.3mg, Levoquine, Solumedrol 40, and 1L NS. Pt admitted to general floor medicine for COVID-19 r/o.  On the floor, pt remained on 6L NC sating in the mid-90s and was titrated down as tolerated. Found to be COVID-19 positive.  Pt had already been on Plaquenil for sarcoidosis, so she was started on the COVID-19 maintenance dose of 200 mg BID x5 days.  Also started on azithromycin.  Pulm consulted.  ID consulted.  Pt continued on abuterol and ipratropium, mtx, and prednisone maintenance.  Pt's outpt Pulmonologist updated. Once patient was deemed stable planned for discharge.           Dispo: home         On_________, discussed with __________, patient is medically cleared and optimized for discharge today. All medications were reviewed with attending, and sent to mutually agreed upon pharmacy. Ms. Nguyen is a 54/F w/ sarcoidosis (dx 2017; on methotrexate and hydroxychloroquine) p/w 2wk hx of cough producing whitish sputum now w/ worsening SOB.  Pt had previously gone to  to test for COVID-19; she received confirmation tonight that her test was positive.  Five days ago, she presented to [facility] w/ similar sxs and was given a Zpack and Prednisone 20mg x4days.  She did not improved, and so presented to our ED.  She denies hx of fever, chills, N/V/D/C, abd pain.  In the ED, pt was afebrile 97.6, tachycardic 120, -178/, tachypneic to 38; sating 91% on 3L NC moved to 96% on 6L NC.  CXR shows b/l LL opacities.  Given Epinephrine 0.3mg, Levoquine, Solumedrol 40, and 1L NS. Pt admitted to general floor medicine for COVID-19 r/o.  On the floor, pt remained on 6L NC sating in the mid-90s and was titrated down as tolerated. Found to be COVID-19 positive.  Pt had already been on Plaquenil for sarcoidosis, so she was started on the COVID-19 maintenance dose of 200 mg BID x5 days.  Also started on azithromycin.  Pulm consulted.  ID consulted.  Pt continued on abuterol and ipratropium, mtx, and prednisone maintenance.  Pt's outpt Pulmonologist updated. Once patient was deemed stable planned for discharge.           Dispo: home         On 4/5/2020, discussed with Dr.Sonia Enriquez, patient is medically cleared and optimized for discharge today. All medications were reviewed with attending, and sent to mutually agreed upon pharmacy. Ms. Nguyen is a 54/F w/ sarcoidosis (dx 2017; on methotrexate and hydroxychloroquine) p/w 2wk hx of cough producing whitish sputum now w/ worsening SOB.  Found to be COVID positive with complication of hypoxic respiratory failure requiring nasal cannula. CXR showed b/l LL opacities.  Pt had already been on Plaquenil for sarcoidosis, so she was started on the COVID-19 maintenance dose of 200 mg BID x5 days and azithromycin. Patient subsequently improved on this regimen including HFA abuterol and ipratropium, mtx, and prednisone maintenance. Patient was able to be weaned off NC to RA and was saturating well at rest and with ambulation. Pt's outpt Pulmonologist updated.         Dispo: home         On 4/5/2020, discussed with Dr.Sonia Enriquez, patient is medically cleared and optimized for discharge today. All medications were reviewed with attending, and sent to mutually agreed upon pharmacy.

## 2020-03-27 NOTE — PROGRESS NOTE ADULT - PROBLEM SELECTOR PLAN 2
resolved  p/w tachycardia, tachypnea, and leukocytosis 2/2 COVID-19 infection  - see COVID-19 infection below resolved  p/w tachycardia, tachypnea, and leukocytosis 2/2 COVID-19 infection  - see COVID-19 infection above

## 2020-03-27 NOTE — PROGRESS NOTE ADULT - PROBLEM SELECTOR PLAN 3
dx in 2017 via lung bx on maintenance mtx and hydroxychloroquine; prednisone reduced recently to 5mg PO QD  - per Pulm, can c/w MTX weekly  - start Plaquenil at COVID-19 reg, can resume Plaquenil for sarcoid afterwards  - c/w home Prednisone  - as per o/p PULM Dr. Page  to obtain additional pt hx.  Pt has mild obstructive dx on mtx qweekly and plaquenil.  Per Dr. Page, pt was not on Prednisone the last time she saw him álvaro dx in 2017 via lung bx on maintenance mtx and hydroxychloroquine; prednisone reduced recently to 5mg PO QD  - per Pulm, can c/w MTX weekly  - start Plaquenil at COVID-19 reg, can resume Plaquenil for sarcoid afterwards  - c/w home Prednisone  - as per o/p PULM Dr. Page  to obtain additional pt hx.  Pt has mild obstructive dx on mtx qweekly and plaquenil.  Per Dr. Page, TTE in July 2019 did not show pHTN; his plan was for repeat echo this year.

## 2020-03-27 NOTE — DISCHARGE NOTE PROVIDER - NSDCFUADDAPPT_GEN_ALL_CORE_FT
Please follow up with your primary care physician, Dr. Kyle Conner, and your pulmonologist, Dr. Ceron for follow up labs and clinical assessments. Please follow up with your primary care physician, Dr. Kyle Conner, and your pulmonologist, Dr. Ceron for follow up labs and clinical after 2 week isolation is finished

## 2020-03-27 NOTE — PROGRESS NOTE ADULT - SUBJECTIVE AND OBJECTIVE BOX
Guille Benson MD, PGY1  Internal Medicine  [facility]: [phone] / [facility]: [id number]    Patient is a 54y old  Female who presents with a chief complaint of SOB, COVID-19 r/o (27 Mar 2020 07:20)    OVERNIGHT EVENTS:    SUBJECTIVE:    focused ROS as above    MEDICATIONS  (STANDING):  ALBUTerol    90 MICROgram(s) HFA Inhaler 2 Puff(s) Inhalation every 6 hours  azithromycin   Tablet 500 milliGRAM(s) Oral daily  budesonide  80 MICROgram(s)/formoterol 4.5 MICROgram(s) Inhaler 2 Puff(s) Inhalation two times a day  cholecalciferol 1000 Unit(s) Oral daily  enoxaparin Injectable 40 milliGRAM(s) SubCutaneous daily  hydroxychloroquine 200 milliGRAM(s) Oral every 12 hours  ipratropium 17 MICROgram(s) HFA Inhaler 1 Puff(s) Inhalation every 6 hours  methotrexate 10 milliGRAM(s) Oral every week  predniSONE   Tablet 5 milliGRAM(s) Oral daily    MEDICATIONS  (PRN):  guaiFENesin   Syrup  (Sugar-Free) 100 milliGRAM(s) Oral every 6 hours PRN Cough      OBJECTIVE:  T(C): 36.8 (03-27-20 @ 06:08), Max: 37.4 (03-26-20 @ 18:00)  HR: 99 (03-27-20 @ 06:08) (86 - 99)  BP: 104/48 (03-27-20 @ 06:08) (104/48 - 130/87)  RR: 20 (03-27-20 @ 06:08) (17 - 20)  SpO2: 99% (03-27-20 @ 06:08) (98% - 100%)    PHYSICAL EXAM  GENERAL: resting in bed, NAD   HEAD:  Atraumatic, normocephalic  EYES: PERRLA, sclera clear  CHEST/LUNG: Clear to auscultation bilaterally; no wheeze  HEART: RRR; S1, S2; no M/R/G  ABDOMEN: soft, non-distended; non-tender; BS present  EXTREMITIES:  2+ Peripheral Pulses; no edema  NEURO: non-focal  PSYCH: appropriate affect  SKIN: No rashes or lesions    LABS:  CAPILLARY BLOOD GLUCOSE      Microbiology:    Culture - Blood (collected 24 Mar 2020 14:37)  Source: .Blood Blood-Venous  Preliminary Report (25 Mar 2020 15:05):    No growth to date.    Culture - Blood (collected 24 Mar 2020 14:37)  Source: .Blood Blood-Peripheral  Preliminary Report (25 Mar 2020 15:05):    No growth to date.      RADIOLOGY & ADDITIONAL TESTS:    Imaging Personally Reviewed:  Consultant(s) Notes Reviewed:    Care Discussed with Consultants/Other Providers: Guille Benson MD, PGY1  Internal Medicine  [facility]: [phone] / [facility]: [id number]    Patient is a 54y old  Female who presents with a chief complaint of SOB, COVID-19 r/o (27 Mar 2020 07:20)    OVERNIGHT EVENTS: no acute issue o/n; either O2 regulator still broken or pt wants it >6L    SUBJECTIVE: still w/ "progressive" cough; denies new complaint; still SOB when O2 is reduced, watches hawkishly whenever her regulator is adjusted, focused on the sound of airflow    focused ROS as above    MEDICATIONS  (STANDING):  ALBUTerol    90 MICROgram(s) HFA Inhaler 2 Puff(s) Inhalation every 6 hours  azithromycin   Tablet 500 milliGRAM(s) Oral daily  budesonide  80 MICROgram(s)/formoterol 4.5 MICROgram(s) Inhaler 2 Puff(s) Inhalation two times a day  cholecalciferol 1000 Unit(s) Oral daily  enoxaparin Injectable 40 milliGRAM(s) SubCutaneous daily  hydroxychloroquine 200 milliGRAM(s) Oral every 12 hours  ipratropium 17 MICROgram(s) HFA Inhaler 1 Puff(s) Inhalation every 6 hours  methotrexate 10 milliGRAM(s) Oral every week  predniSONE   Tablet 5 milliGRAM(s) Oral daily    MEDICATIONS  (PRN):  guaiFENesin   Syrup  (Sugar-Free) 100 milliGRAM(s) Oral every 6 hours PRN Cough      OBJECTIVE:  T(C): 36.8 (03-27-20 @ 06:08), Max: 37.4 (03-26-20 @ 18:00)  HR: 99 (03-27-20 @ 06:08) (86 - 99)  BP: 104/48 (03-27-20 @ 06:08) (104/48 - 130/87)  RR: 20 (03-27-20 @ 06:08) (17 - 20)  SpO2: 99% (03-27-20 @ 06:08) (98% - 100%)    PHYSICAL EXAM  GENERAL: resting in bed, NAD   HEAD:  Atraumatic, normocephalic  EYES: PERRLA, sclera clear  CHEST/LUNG: Course lung sounds difficult to appreciate 2/2 obese body habitus; no wheeze  HEART: RRR; S1, S2; no M/R/G  ABDOMEN: soft, non-distended; non-tender; BS present  EXTREMITIES:  2+ Peripheral Pulses; no edema  NEURO: non-focal  PSYCH: appropriate affect  SKIN: No rashes or lesions    LABS:  CAPILLARY BLOOD GLUCOSE    Microbiology:    Culture - Blood (collected 24 Mar 2020 14:37)  Source: .Blood Blood-Venous  Preliminary Report (25 Mar 2020 15:05):    No growth to date.    Culture - Blood (collected 24 Mar 2020 14:37)  Source: .Blood Blood-Peripheral  Preliminary Report (25 Mar 2020 15:05):    No growth to date.      RADIOLOGY & ADDITIONAL TESTS:    Imaging Personally Reviewed:  Consultant(s) Notes Reviewed:    Care Discussed with Consultants/Other Providers: Guille Benson MD, PGY1  Internal Medicine  [facility]: [phone] / [facility]: [id number]    Patient is a 54y old  Female who presents with a chief complaint of SOB, COVID-19 r/o (27 Mar 2020 07:20)    OVERNIGHT EVENTS: no acute issue o/n; either O2 regulator still broken or pt wants it >6L    SUBJECTIVE: still w/ "progressive" cough; denies new complaint; still SOB when O2 is reduced, watches hawkishly whenever her regulator is adjusted, focused on the sound of airflow    focused ROS as above    MEDICATIONS  (STANDING):  ALBUTerol    90 MICROgram(s) HFA Inhaler 2 Puff(s) Inhalation every 6 hours  azithromycin   Tablet 500 milliGRAM(s) Oral daily  budesonide  80 MICROgram(s)/formoterol 4.5 MICROgram(s) Inhaler 2 Puff(s) Inhalation two times a day  cholecalciferol 1000 Unit(s) Oral daily  enoxaparin Injectable 40 milliGRAM(s) SubCutaneous daily  hydroxychloroquine 200 milliGRAM(s) Oral every 12 hours  ipratropium 17 MICROgram(s) HFA Inhaler 1 Puff(s) Inhalation every 6 hours  methotrexate 10 milliGRAM(s) Oral every week  predniSONE   Tablet 5 milliGRAM(s) Oral daily    MEDICATIONS  (PRN):  guaiFENesin   Syrup  (Sugar-Free) 100 milliGRAM(s) Oral every 6 hours PRN Cough      OBJECTIVE:  T(C): 36.8 (03-27-20 @ 06:08), Max: 37.4 (03-26-20 @ 18:00)  HR: 99 (03-27-20 @ 06:08) (86 - 99)  BP: 104/48 (03-27-20 @ 06:08) (104/48 - 130/87)  RR: 20 (03-27-20 @ 06:08) (17 - 20)  SpO2: 99% (03-27-20 @ 06:08) (98% - 100%)    PHYSICAL EXAM  GENERAL: resting in bed, NAD   HEAD:  Atraumatic, normocephalic  EYES: PERRLA, sclera clear  CHEST/LUNG: Course lung sounds difficult to appreciate 2/2 obese body habitus; no wheeze  HEART: RRR; S1, S2; no M/R/G  ABDOMEN: soft, non-distended; non-tender; BS present  EXTREMITIES:  2+ Peripheral Pulses; no edema  NEURO: non-focal  PSYCH: appropriate affect  SKIN: No rashes or lesions    LABS:                        10.9   7.59  )-----------( 274      ( 27 Mar 2020 07:20 )             33.8   03-27    136  |  96<L>  |  12  ----------------------------<  84  4.2   |  28  |  0.75    Ca    9.2      27 Mar 2020 07:20  Phos  2.9     03-27  Mg     2.2     03-27    TPro  7.1  /  Alb  3.1<L>  /  TBili  1.3<H>  /  DBili  x   /  AST  41<H>  /  ALT  41<H>  /  AlkPhos  69  03-26      Microbiology:    Culture - Blood (collected 24 Mar 2020 14:37)  Source: .Blood Blood-Venous  Preliminary Report (25 Mar 2020 15:05):    No growth to date.    Culture - Blood (collected 24 Mar 2020 14:37)  Source: .Blood Blood-Peripheral  Preliminary Report (25 Mar 2020 15:05):    No growth to date.      RADIOLOGY & ADDITIONAL TESTS:    Imaging Personally Reviewed:  Consultant(s) Notes Reviewed:    Care Discussed with Consultants/Other Providers: Guille Benson MD, PGY1  Internal Medicine  [facility]: [phone] / [facility]: [id number]    Patient is a 54y old  Female who presents with a chief complaint of SOB, COVID-19 r/o (27 Mar 2020 07:20)    OVERNIGHT EVENTS: no acute issue o/n;    SUBJECTIVE: still w/ "progressive" cough; denies new complaint; still SOB when O2 is reduced, watches hawkishly whenever her regulator is adjusted, focused on the sound of airflow    focused ROS as above    MEDICATIONS  (STANDING):  ALBUTerol    90 MICROgram(s) HFA Inhaler 2 Puff(s) Inhalation every 6 hours  azithromycin   Tablet 500 milliGRAM(s) Oral daily  budesonide  80 MICROgram(s)/formoterol 4.5 MICROgram(s) Inhaler 2 Puff(s) Inhalation two times a day  cholecalciferol 1000 Unit(s) Oral daily  enoxaparin Injectable 40 milliGRAM(s) SubCutaneous daily  hydroxychloroquine 200 milliGRAM(s) Oral every 12 hours  ipratropium 17 MICROgram(s) HFA Inhaler 1 Puff(s) Inhalation every 6 hours  methotrexate 10 milliGRAM(s) Oral every week  predniSONE   Tablet 5 milliGRAM(s) Oral daily    MEDICATIONS  (PRN):  guaiFENesin   Syrup  (Sugar-Free) 100 milliGRAM(s) Oral every 6 hours PRN Cough      OBJECTIVE:  T(C): 36.8 (03-27-20 @ 06:08), Max: 37.4 (03-26-20 @ 18:00)  HR: 99 (03-27-20 @ 06:08) (86 - 99)  BP: 104/48 (03-27-20 @ 06:08) (104/48 - 130/87)  RR: 20 (03-27-20 @ 06:08) (17 - 20)  SpO2: 99% (03-27-20 @ 06:08) (98% - 100%)    PHYSICAL EXAM  GENERAL: resting in bed, NAD   HEAD:  Atraumatic, normocephalic  EYES: PERRLA, sclera clear  CHEST/LUNG: Course lung sounds difficult to appreciate 2/2 obese body habitus; no wheeze  HEART: RRR; S1, S2; no M/R/G  ABDOMEN: soft, non-distended; non-tender; BS present  EXTREMITIES:  2+ Peripheral Pulses; no edema  NEURO: non-focal  PSYCH: appropriate affect  SKIN: No rashes or lesions    LABS:                        10.9   7.59  )-----------( 274      ( 27 Mar 2020 07:20 )             33.8   03-27    136  |  96<L>  |  12  ----------------------------<  84  4.2   |  28  |  0.75    Ca    9.2      27 Mar 2020 07:20  Phos  2.9     03-27  Mg     2.2     03-27    TPro  7.1  /  Alb  3.1<L>  /  TBili  1.3<H>  /  DBili  x   /  AST  41<H>  /  ALT  41<H>  /  AlkPhos  69  03-26      Microbiology:    Culture - Blood (collected 24 Mar 2020 14:37)  Source: .Blood Blood-Venous  Preliminary Report (25 Mar 2020 15:05):    No growth to date.    Culture - Blood (collected 24 Mar 2020 14:37)  Source: .Blood Blood-Peripheral  Preliminary Report (25 Mar 2020 15:05):    No growth to date.      RADIOLOGY & ADDITIONAL TESTS:    Imaging Personally Reviewed:  Consultant(s) Notes Reviewed:    Care Discussed with Consultants/Other Providers:

## 2020-03-27 NOTE — DISCHARGE NOTE PROVIDER - NSDCMRMEDTOKEN_GEN_ALL_CORE_FT
Advair Diskus 250 mcg-50 mcg inhalation powder: inhaled 2 times a day  albuterol 5 mg/mL (0.5%) inhalation solution: 0.5 milliliter(s) inhaled every 6 hours, As Needed - for shortness of breath and/or wheezing  budesonide 0.5 mg/2 mL inhalation suspension: 2 milliliter(s) inhaled 2 times a day  hydroxychloroquine: 400 milligram(s) orally once a day  methotrexate 2.5 mg oral tablet: orally 4 times a week  naproxen 500 mg (as sodium) oral tablet, extended release: 1 tab(s) orally once a day  Vitamin D3 50,000 intl units (1250 mcg) oral capsule: 1 cap(s) orally once a week acetaminophen 325 mg oral capsule: 2 cap(s) orally every 6 hours for fever or pain  Advair Diskus 250 mcg-50 mcg inhalation powder: inhaled 2 times a day  albuterol 5 mg/mL (0.5%) inhalation solution: 0.5 milliliter(s) inhaled every 6 hours, As Needed - for shortness of breath and/or wheezing  budesonide 0.5 mg/2 mL inhalation suspension: 2 milliliter(s) inhaled 2 times a day  hydroxychloroquine: 400 milligram(s) orally once a day  methotrexate 2.5 mg oral tablet: orally 4 times a week  naproxen 500 mg (as sodium) oral tablet, extended release: 1 tab(s) orally once a day  Robitussin Cough &amp; Congestion DM Max: 20 milliliter(s) orally every 4 hours while awake MDD:120 ml cough and congestion  Vitamin D3 50,000 intl units (1250 mcg) oral capsule: 1 cap(s) orally once a week acetaminophen 325 mg oral capsule: 2 cap(s) orally every 6 hours for fever or pain  Advair Diskus 250 mcg-50 mcg inhalation powder: inhaled 2 times a day  albuterol 5 mg/mL (0.5%) inhalation solution: 0.5 milliliter(s) inhaled every 6 hours, As Needed - for shortness of breath and/or wheezing  budesonide 0.5 mg/2 mL inhalation suspension: 2 milliliter(s) inhaled 2 times a day  hydroxychloroquine: 400 milligram(s) orally once a day  methotrexate 2.5 mg oral tablet: orally 4 times a week  naproxen 500 mg (as sodium) oral tablet, extended release: 1 tab(s) orally once a day  predniSONE 5 mg oral tablet: 1 tab(s) orally once a day  Robitussin Cough &amp; Congestion DM Max: 20 milliliter(s) orally every 4 hours while awake MDD:120 ml cough and congestion  Vitamin D3 50,000 intl units (1250 mcg) oral capsule: 1 cap(s) orally once a week acetaminophen 325 mg oral capsule: 2 cap(s) orally every 6 hours for fever or pain  Advair Diskus 250 mcg-50 mcg inhalation powder: inhaled 2 times a day  albuterol 5 mg/mL (0.5%) inhalation solution: 0.5 milliliter(s) inhaled every 6 hours, As Needed - for shortness of breath and/or wheezing  budesonide 0.5 mg/2 mL inhalation suspension: 2 milliliter(s) inhaled 2 times a day  guaiFENesin 100 mg/5 mL oral liquid: 10 milliliter(s) orally every 6 hours, As Needed  -for cough   hydroxychloroquine 200 mg oral tablet: 2 tab(s) orally once a day  methotrexate 2.5 mg oral tablet: orally 4 times a week  naproxen 500 mg (as sodium) oral tablet, extended release: 1 tab(s) orally once a day  predniSONE 5 mg oral tablet: 1 tab(s) orally once a day  Vitamin D3 50,000 intl units (1250 mcg) oral capsule: 1 cap(s) orally once a week

## 2020-03-27 NOTE — PROGRESS NOTE ADULT - PROBLEM SELECTOR PLAN 4
VTE ppx: lovenox  Diet: regular VTE ppx: lovenox  Diet: regular  Dispo: pending decreasing O2 requirement

## 2020-03-27 NOTE — DISCHARGE NOTE PROVIDER - NSDCCPCAREPLAN_GEN_ALL_CORE_FT
PRINCIPAL DISCHARGE DIAGNOSIS  Diagnosis: Infection due to 2019 novel coronavirus  Assessment and Plan of Treatment: You came to the hospital because of a cough lasting several weeks and worsening shortness of breath.  You were found to be infected by the 2019 novel corona virus and were treated supportively with supplemental oxygen and medically with hydroxychloriquine and azithromycin.      SECONDARY DISCHARGE DIAGNOSES  Diagnosis: Sarcoidosis  Assessment and Plan of Treatment: You were diagnosed with Sarcoidosis in 2017 and are on maintenance medications for it.  After consulting with our pulmonologists, you were continued on your home medications.  Your pulmonologist Dr. Page was updated by your primary team. PRINCIPAL DISCHARGE DIAGNOSIS  Diagnosis: Infection due to 2019 novel coronavirus  Assessment and Plan of Treatment: You have been diagnosed with the COVID-19 virus during your hospital stay. You must self quarantine to complete a 14 day time period.  Monitor for fevers, shortness of breath and cough primarily.  Monitor your temperature daily to not any changes and increases.    It has been determined that you no longer need hospitalization and can recover while remaining in self-quarantine at home. You should follow the prevention steps below until a healthcare provider or local or state health department says you can return to your normal activities.  1. You should restrict activities outside your home, except for getting medical care.  2. Do not go to work, school, or public areas.  3. Avoid using public transportation, ride-sharing, or taxis.  4. Separate yourself from other people and animals in your home.  People: As much as possible, you should stay in a specific room and away from other people in your home. Also, you should use a separate bathroom, if available.  Animals: You should restrict contact with pets and other animals while you are sick with COVID-19, just like you would around other people. Although there have not been reports of pets or other animals becoming sick with COVID-19, it is still recommended that people sick with COVID-19 limit contact with animals until more information is known about the virus.  When possible, have another member of your household care for your animals while you are sick. If you must care for your pet or be around animals while you are sick, wash your hands before and after you interact with pets and wear a facemask.  5. Call ahead before visiting your doctor.  If you have a medical appointment, call the healthcare provider and tell them that you have or may have COVID-19. This will help the healthcare provider’s office take steps to keep other people from getting infected or exposed.  6. Wear a facemask.  You should wear a facemask when you are around other people (e.g., sharing a room or vehicle) or pets and before you enter a healthcare pro      SECONDARY DISCHARGE DIAGNOSES  Diagnosis: Sarcoidosis  Assessment and Plan of Treatment: You were diagnosed with Sarcoidosis in 2017 and are on maintenance medications for it.  After consulting with our pulmonologists, you were continued on your home medications.  Your pulmonologist Dr. Page was updated by your primary team.

## 2020-03-27 NOTE — PROGRESS NOTE ADULT - ASSESSMENT
54/F w/ sarcoidosis (2017, on mtx 4x/wk, plaquenil 400 QD, prednisone 5) and no other significant PMH p/w 2w hx of productive cough and worsening SOB.  Found to have b/l LL opacities on CXR, tested positive for COVID-19 at outside facility a/f COVID-19 PNA w/ possible sarcoidosis flare. 54/F w/ sarcoidosis (2017, on mtx 4x/wk, plaquenil 400 QD, prednisone 5) and no other significant PMH p/w 2w hx of productive cough and worsening SOB.  Found to have b/l LL opacities on CXR, a/f COVID-19 PNA confirmed by PCR

## 2020-03-28 LAB
ALBUMIN SERPL ELPH-MCNC: 3 G/DL — LOW (ref 3.3–5)
ALP SERPL-CCNC: 73 U/L — SIGNIFICANT CHANGE UP (ref 40–120)
ALT FLD-CCNC: 35 U/L — HIGH (ref 4–33)
ANION GAP SERPL CALC-SCNC: 11 MMO/L — SIGNIFICANT CHANGE UP (ref 7–14)
AST SERPL-CCNC: 33 U/L — HIGH (ref 4–32)
BASOPHILS # BLD AUTO: 0.03 K/UL — SIGNIFICANT CHANGE UP (ref 0–0.2)
BASOPHILS NFR BLD AUTO: 0.5 % — SIGNIFICANT CHANGE UP (ref 0–2)
BASOPHILS NFR SPEC: 0 % — SIGNIFICANT CHANGE UP (ref 0–2)
BILIRUB SERPL-MCNC: 1 MG/DL — SIGNIFICANT CHANGE UP (ref 0.2–1.2)
BLASTS # FLD: 0 % — SIGNIFICANT CHANGE UP (ref 0–0)
BUN SERPL-MCNC: 10 MG/DL — SIGNIFICANT CHANGE UP (ref 7–23)
CALCIUM SERPL-MCNC: 9.2 MG/DL — SIGNIFICANT CHANGE UP (ref 8.4–10.5)
CHLORIDE SERPL-SCNC: 94 MMOL/L — LOW (ref 98–107)
CO2 SERPL-SCNC: 27 MMOL/L — SIGNIFICANT CHANGE UP (ref 22–31)
CREAT SERPL-MCNC: 0.77 MG/DL — SIGNIFICANT CHANGE UP (ref 0.5–1.3)
CRP SERPL-MCNC: 96.7 MG/L — HIGH
EOSINOPHIL # BLD AUTO: 0.16 K/UL — SIGNIFICANT CHANGE UP (ref 0–0.5)
EOSINOPHIL NFR BLD AUTO: 2.4 % — SIGNIFICANT CHANGE UP (ref 0–6)
EOSINOPHIL NFR FLD: 2.5 % — SIGNIFICANT CHANGE UP (ref 0–6)
GLUCOSE SERPL-MCNC: 92 MG/DL — SIGNIFICANT CHANGE UP (ref 70–99)
HCT VFR BLD CALC: 33.6 % — LOW (ref 34.5–45)
HGB BLD-MCNC: 10.9 G/DL — LOW (ref 11.5–15.5)
IMM GRANULOCYTES NFR BLD AUTO: 5.6 % — HIGH (ref 0–1.5)
LYMPHOCYTES # BLD AUTO: 0.22 K/UL — LOW (ref 1–3.3)
LYMPHOCYTES # BLD AUTO: 3.3 % — LOW (ref 13–44)
LYMPHOCYTES NFR SPEC AUTO: 0.8 % — LOW (ref 13–44)
MAGNESIUM SERPL-MCNC: 2 MG/DL — SIGNIFICANT CHANGE UP (ref 1.6–2.6)
MCHC RBC-ENTMCNC: 30.6 PG — SIGNIFICANT CHANGE UP (ref 27–34)
MCHC RBC-ENTMCNC: 32.4 % — SIGNIFICANT CHANGE UP (ref 32–36)
MCV RBC AUTO: 94.4 FL — SIGNIFICANT CHANGE UP (ref 80–100)
METAMYELOCYTES # FLD: 1.6 % — HIGH (ref 0–1)
MONOCYTES # BLD AUTO: 0.27 K/UL — SIGNIFICANT CHANGE UP (ref 0–0.9)
MONOCYTES NFR BLD AUTO: 4.1 % — SIGNIFICANT CHANGE UP (ref 2–14)
MONOCYTES NFR BLD: 2.5 % — SIGNIFICANT CHANGE UP (ref 2–9)
MYELOCYTES NFR BLD: 0 % — SIGNIFICANT CHANGE UP (ref 0–0)
NEUTROPHIL AB SER-ACNC: 91 % — HIGH (ref 43–77)
NEUTROPHILS # BLD AUTO: 5.61 K/UL — SIGNIFICANT CHANGE UP (ref 1.8–7.4)
NEUTROPHILS NFR BLD AUTO: 84.1 % — HIGH (ref 43–77)
NEUTS BAND # BLD: 0 % — SIGNIFICANT CHANGE UP (ref 0–6)
NRBC # FLD: 0 K/UL — SIGNIFICANT CHANGE UP (ref 0–0)
OTHER - HEMATOLOGY %: 0 — SIGNIFICANT CHANGE UP
PHOSPHATE SERPL-MCNC: 2.8 MG/DL — SIGNIFICANT CHANGE UP (ref 2.5–4.5)
PLATELET # BLD AUTO: 289 K/UL — SIGNIFICANT CHANGE UP (ref 150–400)
PLATELET COUNT - ESTIMATE: NORMAL — SIGNIFICANT CHANGE UP
PMV BLD: 11 FL — SIGNIFICANT CHANGE UP (ref 7–13)
POTASSIUM SERPL-MCNC: 3.6 MMOL/L — SIGNIFICANT CHANGE UP (ref 3.5–5.3)
POTASSIUM SERPL-SCNC: 3.6 MMOL/L — SIGNIFICANT CHANGE UP (ref 3.5–5.3)
PROMYELOCYTES # FLD: 0 % — SIGNIFICANT CHANGE UP (ref 0–0)
PROT SERPL-MCNC: 7.2 G/DL — SIGNIFICANT CHANGE UP (ref 6–8.3)
RBC # BLD: 3.56 M/UL — LOW (ref 3.8–5.2)
RBC # FLD: 12.5 % — SIGNIFICANT CHANGE UP (ref 10.3–14.5)
SODIUM SERPL-SCNC: 132 MMOL/L — LOW (ref 135–145)
VARIANT LYMPHS # BLD: 1.6 % — SIGNIFICANT CHANGE UP
WBC # BLD: 6.66 K/UL — SIGNIFICANT CHANGE UP (ref 3.8–10.5)
WBC # FLD AUTO: 6.66 K/UL — SIGNIFICANT CHANGE UP (ref 3.8–10.5)

## 2020-03-28 PROCEDURE — 99233 SBSQ HOSP IP/OBS HIGH 50: CPT | Mod: GC

## 2020-03-28 RX ADMIN — Medication 1 PUFF(S): at 23:28

## 2020-03-28 RX ADMIN — ALBUTEROL 2 PUFF(S): 90 AEROSOL, METERED ORAL at 05:20

## 2020-03-28 RX ADMIN — Medication 200 MILLIGRAM(S): at 05:19

## 2020-03-28 RX ADMIN — BUDESONIDE AND FORMOTEROL FUMARATE DIHYDRATE 2 PUFF(S): 160; 4.5 AEROSOL RESPIRATORY (INHALATION) at 21:17

## 2020-03-28 RX ADMIN — Medication 200 MILLIGRAM(S): at 18:05

## 2020-03-28 RX ADMIN — BUDESONIDE AND FORMOTEROL FUMARATE DIHYDRATE 2 PUFF(S): 160; 4.5 AEROSOL RESPIRATORY (INHALATION) at 12:29

## 2020-03-28 RX ADMIN — ALBUTEROL 2 PUFF(S): 90 AEROSOL, METERED ORAL at 12:30

## 2020-03-28 RX ADMIN — ALBUTEROL 2 PUFF(S): 90 AEROSOL, METERED ORAL at 18:05

## 2020-03-28 RX ADMIN — Medication 5 MILLIGRAM(S): at 05:19

## 2020-03-28 RX ADMIN — Medication 1 PUFF(S): at 18:05

## 2020-03-28 RX ADMIN — ALBUTEROL 2 PUFF(S): 90 AEROSOL, METERED ORAL at 23:28

## 2020-03-28 RX ADMIN — ENOXAPARIN SODIUM 40 MILLIGRAM(S): 100 INJECTION SUBCUTANEOUS at 12:30

## 2020-03-28 RX ADMIN — Medication 1 PUFF(S): at 05:20

## 2020-03-28 RX ADMIN — AZITHROMYCIN 500 MILLIGRAM(S): 500 TABLET, FILM COATED ORAL at 12:30

## 2020-03-28 RX ADMIN — Medication 1000 UNIT(S): at 12:31

## 2020-03-28 NOTE — PROGRESS NOTE ADULT - ASSESSMENT
54/F w/ sarcoidosis (2017, on mtx 4x/wk, plaquenil 400 QD, prednisone 5) and no other significant PMH p/w 2w hx of productive cough and worsening SOB.  Found to have b/l LL opacities on CXR, a/f COVID-19 PNA confirmed by PCR

## 2020-03-28 NOTE — PROGRESS NOTE ADULT - SUBJECTIVE AND OBJECTIVE BOX
Guille Benson MD, PGY1  Internal Medicine  [facility]: [phone] / [facility]: [id number]    Patient is a 54y old  Female who presents with a chief complaint of SOB, COVID-19 r/o (28 Mar 2020 06:31)    OVERNIGHT EVENTS/SUBJECTIVE: pt interviewed over telephone: c/o CADE when she tried to get up and use the bed pan; CADE resolves after resting; denies F/C, N/V/D/C; this AM, feels like she's having a yeast infection, and feels like she has a sore on her mouth; unk hx of cold sore    focused ROS as above    MEDICATIONS  (STANDING):  ALBUTerol    90 MICROgram(s) HFA Inhaler 2 Puff(s) Inhalation every 6 hours  azithromycin   Tablet 500 milliGRAM(s) Oral daily  budesonide  80 MICROgram(s)/formoterol 4.5 MICROgram(s) Inhaler 2 Puff(s) Inhalation two times a day  cholecalciferol 1000 Unit(s) Oral daily  enoxaparin Injectable 40 milliGRAM(s) SubCutaneous daily  hydroxychloroquine 200 milliGRAM(s) Oral every 12 hours  ipratropium 17 MICROgram(s) HFA Inhaler 1 Puff(s) Inhalation every 6 hours  methotrexate 10 milliGRAM(s) Oral every week  predniSONE   Tablet 5 milliGRAM(s) Oral daily    MEDICATIONS  (PRN):  guaiFENesin   Syrup  (Sugar-Free) 100 milliGRAM(s) Oral every 6 hours PRN Cough    OBJECTIVE:  T(C): 36.8 (03-28-20 @ 05:18), Max: 37.7 (03-27-20 @ 11:07)  HR: 93 (03-28-20 @ 05:18) (84 - 100)  BP: 126/56 (03-28-20 @ 05:18) (106/56 - 144/62)  RR: 18 (03-28-20 @ 05:18) (18 - 20)  SpO2: 98% (03-28-20 @ 05:18) (98% - 100%)    PHYSICAL EXAM  GENERAL: NAD   HEAD:  Atraumatic, normocephalic  EYES: PERRLA, sclera clear  CHEST/LUNG: Clear to auscultation bilaterally; no wheeze  HEART: RRR; S1, S2; no M/R/G  ABDOMEN: soft, non-distended; non-tender; BS present  EXTREMITIES:  2+ Peripheral Pulses; no edema  NEURO: non-focal  PSYCH: appropriate affect  SKIN: No rashes or lesions    LABS:  CAPILLARY BLOOD GLUCOSE    I&O's Summary    27 Mar 2020 07:01  -  28 Mar 2020 07:00  --------------------------------------------------------  IN: 1160 mL / OUT: 950 mL / NET: 210 mL                        10.9   6.66  )-----------( 289      ( 28 Mar 2020 06:50 )             33.6     03-28    132<L>  |  94<L>  |  10  ----------------------------<  92  3.6   |  27  |  0.77    Ca    9.2      28 Mar 2020 06:50  Phos  2.8     03-28  Mg     2.0     03-28    TPro  7.2  /  Alb  3.0<L>  /  TBili  1.0  /  DBili  x   /  AST  33<H>  /  ALT  35<H>  /  AlkPhos  73  03-28              Microbiology:      RADIOLOGY & ADDITIONAL TESTS:    Imaging Personally Reviewed:  Consultant(s) Notes Reviewed:    Care Discussed with Consultants/Other Providers:

## 2020-03-28 NOTE — PROGRESS NOTE ADULT - PROBLEM SELECTOR PLAN 3
dx in 2017 via lung bx on maintenance mtx and hydroxychloroquine; prednisone reduced recently to 5mg PO QD  - per Pulm, can c/w MTX weekly  - start Plaquenil at COVID-19 reg, can resume Plaquenil for sarcoid afterwards  - c/w home Prednisone  - as per o/p PULM Dr. Page  to obtain additional pt hx.  Pt has mild obstructive dx on mtx qweekly and plaquenil.  Per Dr. Page, TTE in July 2019 did not show pHTN; his plan was for repeat echo this year.

## 2020-03-28 NOTE — PROGRESS NOTE ADULT - PROBLEM SELECTOR PLAN 2
resolved  p/w tachycardia, tachypnea, and leukocytosis 2/2 COVID-19 infection  - see COVID-19 infection above

## 2020-03-28 NOTE — PROGRESS NOTE ADULT - PROBLEM SELECTOR PLAN 5
Transitions of Care Status:  1.  Name of PCP: Dr. Kyle Conner, [phone],  2.  PCP Contacted on Admission: [x] Y    [ ] N   --- left  w/ staff  2.1 Direct update given by me to pt's outpt Pulmonologist Dr. Page on 3/25  3.  PCP contacted at Discharge: [ ] Y    [ ] N    [ ] N/A  4.  Post-Discharge Appointment Date and Location:  5.  Summary of Handoff given to PCP:

## 2020-03-28 NOTE — PROGRESS NOTE ADULT - PROBLEM SELECTOR PLAN 1
p/w 2 wk hx of productive cough and worsening SOB, found to have b/l lower lobe opacities. Remains on likely 6L NC without prior home oxygen requirement  - COVID-19 positive   - c/w Plaquenil COVID-19 dosing (3/25- ), will resume home Plaquenil for Sarcoidosis afterwards  - goal O2 sat low to mid-90s on 6LNC, will try to titrate down today as tolerated  - previously given Zpack; c/w azithromycin 500 PO QD (3/25- ) x5d  - tessalon perles and cepacol for cough  - pulm recs appreciated  - encourage ambulation

## 2020-03-29 LAB
ALBUMIN SERPL ELPH-MCNC: 2.9 G/DL — LOW (ref 3.3–5)
ALP SERPL-CCNC: 75 U/L — SIGNIFICANT CHANGE UP (ref 40–120)
ALT FLD-CCNC: 31 U/L — SIGNIFICANT CHANGE UP (ref 4–33)
ANION GAP SERPL CALC-SCNC: 14 MMO/L — SIGNIFICANT CHANGE UP (ref 7–14)
AST SERPL-CCNC: 28 U/L — SIGNIFICANT CHANGE UP (ref 4–32)
BASOPHILS # BLD AUTO: 0.02 K/UL — SIGNIFICANT CHANGE UP (ref 0–0.2)
BASOPHILS NFR BLD AUTO: 0.3 % — SIGNIFICANT CHANGE UP (ref 0–2)
BILIRUB SERPL-MCNC: 0.9 MG/DL — SIGNIFICANT CHANGE UP (ref 0.2–1.2)
BUN SERPL-MCNC: 10 MG/DL — SIGNIFICANT CHANGE UP (ref 7–23)
CALCIUM SERPL-MCNC: 9.3 MG/DL — SIGNIFICANT CHANGE UP (ref 8.4–10.5)
CHLORIDE SERPL-SCNC: 93 MMOL/L — LOW (ref 98–107)
CO2 SERPL-SCNC: 28 MMOL/L — SIGNIFICANT CHANGE UP (ref 22–31)
CREAT SERPL-MCNC: 0.74 MG/DL — SIGNIFICANT CHANGE UP (ref 0.5–1.3)
CRP SERPL-MCNC: 59 MG/L — HIGH
CULTURE RESULTS: SIGNIFICANT CHANGE UP
CULTURE RESULTS: SIGNIFICANT CHANGE UP
EOSINOPHIL # BLD AUTO: 0.15 K/UL — SIGNIFICANT CHANGE UP (ref 0–0.5)
EOSINOPHIL NFR BLD AUTO: 2.1 % — SIGNIFICANT CHANGE UP (ref 0–6)
GLUCOSE SERPL-MCNC: 90 MG/DL — SIGNIFICANT CHANGE UP (ref 70–99)
HCT VFR BLD CALC: 33.7 % — LOW (ref 34.5–45)
HGB BLD-MCNC: 10.7 G/DL — LOW (ref 11.5–15.5)
IMM GRANULOCYTES NFR BLD AUTO: 5.1 % — HIGH (ref 0–1.5)
LYMPHOCYTES # BLD AUTO: 0.19 K/UL — LOW (ref 1–3.3)
LYMPHOCYTES # BLD AUTO: 2.7 % — LOW (ref 13–44)
MAGNESIUM SERPL-MCNC: 1.9 MG/DL — SIGNIFICANT CHANGE UP (ref 1.6–2.6)
MANUAL SMEAR VERIFICATION: SIGNIFICANT CHANGE UP
MCHC RBC-ENTMCNC: 30.4 PG — SIGNIFICANT CHANGE UP (ref 27–34)
MCHC RBC-ENTMCNC: 31.8 % — LOW (ref 32–36)
MCV RBC AUTO: 95.7 FL — SIGNIFICANT CHANGE UP (ref 80–100)
MONOCYTES # BLD AUTO: 0.27 K/UL — SIGNIFICANT CHANGE UP (ref 0–0.9)
MONOCYTES NFR BLD AUTO: 3.8 % — SIGNIFICANT CHANGE UP (ref 2–14)
NEUTROPHILS # BLD AUTO: 6.13 K/UL — SIGNIFICANT CHANGE UP (ref 1.8–7.4)
NEUTROPHILS NFR BLD AUTO: 86 % — HIGH (ref 43–77)
NRBC # FLD: 0 K/UL — SIGNIFICANT CHANGE UP (ref 0–0)
PHOSPHATE SERPL-MCNC: 3.3 MG/DL — SIGNIFICANT CHANGE UP (ref 2.5–4.5)
PLATELET # BLD AUTO: 261 K/UL — SIGNIFICANT CHANGE UP (ref 150–400)
PMV BLD: 10.9 FL — SIGNIFICANT CHANGE UP (ref 7–13)
POTASSIUM SERPL-MCNC: 3.7 MMOL/L — SIGNIFICANT CHANGE UP (ref 3.5–5.3)
POTASSIUM SERPL-SCNC: 3.7 MMOL/L — SIGNIFICANT CHANGE UP (ref 3.5–5.3)
PROT SERPL-MCNC: 7.1 G/DL — SIGNIFICANT CHANGE UP (ref 6–8.3)
RBC # BLD: 3.52 M/UL — LOW (ref 3.8–5.2)
RBC # FLD: 12.5 % — SIGNIFICANT CHANGE UP (ref 10.3–14.5)
SODIUM SERPL-SCNC: 135 MMOL/L — SIGNIFICANT CHANGE UP (ref 135–145)
SPECIMEN SOURCE: SIGNIFICANT CHANGE UP
SPECIMEN SOURCE: SIGNIFICANT CHANGE UP
WBC # BLD: 7.12 K/UL — SIGNIFICANT CHANGE UP (ref 3.8–10.5)
WBC # FLD AUTO: 7.12 K/UL — SIGNIFICANT CHANGE UP (ref 3.8–10.5)

## 2020-03-29 PROCEDURE — 99232 SBSQ HOSP IP/OBS MODERATE 35: CPT | Mod: GC

## 2020-03-29 RX ORDER — FLUCONAZOLE 150 MG/1
150 TABLET ORAL ONCE
Refills: 0 | Status: COMPLETED | OUTPATIENT
Start: 2020-03-29 | End: 2020-03-29

## 2020-03-29 RX ADMIN — ALBUTEROL 2 PUFF(S): 90 AEROSOL, METERED ORAL at 05:03

## 2020-03-29 RX ADMIN — ALBUTEROL 2 PUFF(S): 90 AEROSOL, METERED ORAL at 11:50

## 2020-03-29 RX ADMIN — Medication 1 PUFF(S): at 17:42

## 2020-03-29 RX ADMIN — Medication 200 MILLIGRAM(S): at 17:17

## 2020-03-29 RX ADMIN — Medication 200 MILLIGRAM(S): at 05:03

## 2020-03-29 RX ADMIN — ENOXAPARIN SODIUM 40 MILLIGRAM(S): 100 INJECTION SUBCUTANEOUS at 11:43

## 2020-03-29 RX ADMIN — ALBUTEROL 2 PUFF(S): 90 AEROSOL, METERED ORAL at 17:42

## 2020-03-29 RX ADMIN — Medication 5 MILLIGRAM(S): at 05:03

## 2020-03-29 RX ADMIN — Medication 1000 UNIT(S): at 11:43

## 2020-03-29 RX ADMIN — AZITHROMYCIN 500 MILLIGRAM(S): 500 TABLET, FILM COATED ORAL at 11:43

## 2020-03-29 RX ADMIN — FLUCONAZOLE 150 MILLIGRAM(S): 150 TABLET ORAL at 14:02

## 2020-03-29 RX ADMIN — BUDESONIDE AND FORMOTEROL FUMARATE DIHYDRATE 2 PUFF(S): 160; 4.5 AEROSOL RESPIRATORY (INHALATION) at 10:03

## 2020-03-29 RX ADMIN — BUDESONIDE AND FORMOTEROL FUMARATE DIHYDRATE 2 PUFF(S): 160; 4.5 AEROSOL RESPIRATORY (INHALATION) at 21:25

## 2020-03-29 RX ADMIN — Medication 1 PUFF(S): at 11:50

## 2020-03-29 RX ADMIN — Medication 1 PUFF(S): at 05:04

## 2020-03-29 RX ADMIN — Medication 100 MILLIGRAM(S): at 22:12

## 2020-03-29 NOTE — PROGRESS NOTE ADULT - ASSESSMENT
54/F w/ sarcoidosis (2017, on mtx 4x/wk, plaquenil 400 QD, prednisone 5) and no other significant PMH p/w 2w hx of productive cough and worsening SOB.  Found to have b/l LL opacities on CXR, a/f COVID-19 PNA confirmed by PCR.

## 2020-03-29 NOTE — PROGRESS NOTE ADULT - PROBLEM SELECTOR PLAN 1
p/w 2 wk hx of productive cough and worsening SOB, found to have b/l lower lobe opacities. Remains on likely 6L NC without prior home oxygen requirement  - COVID-19 positive   - c/w Plaquenil COVID-19 dosing (3/25- ), will resume home Plaquenil for Sarcoidosis afterwards  - goal O2 sat low to mid-90s on 6LNC, will try to titrate down today as tolerated  - previously given Zpack; c/w azithromycin 500 PO QD (3/25- ) x5d  - tessalon perles and cepacol for cough  - pulm recs appreciated  - encourage ambulation p/w 2 wk hx of productive cough and worsening SOB, found to have b/l lower lobe opacities. Remains on likely 6L NC without prior home oxygen requirement  - COVID-19 positive   - c/w Plaquenil COVID-19 dosing (3/25- ), will resume home Plaquenil for Sarcoidosis afterwards  - goal O2 sat low to mid-90s on 4LNC, will try to titrate down today as tolerated  - previously given Zpack; c/w azithromycin 500 PO QD (3/25- ) x5d  - tessalon perles and cepacol for cough  - pulm recs appreciated  - encourage ambulation

## 2020-03-29 NOTE — PROGRESS NOTE ADULT - SUBJECTIVE AND OBJECTIVE BOX
Guille Benson MD, PGY1  Internal Medicine  [facility]: [phone] / [facility]: [id number]    Patient is a 54y old  Female who presents with a chief complaint of SOB, COVID-19 r/o (28 Mar 2020 06:31)    OVERNIGHT EVENTS:    SUBJECTIVE:    focused ROS as above    MEDICATIONS  (STANDING):  ALBUTerol    90 MICROgram(s) HFA Inhaler 2 Puff(s) Inhalation every 6 hours  azithromycin   Tablet 500 milliGRAM(s) Oral daily  budesonide  80 MICROgram(s)/formoterol 4.5 MICROgram(s) Inhaler 2 Puff(s) Inhalation two times a day  cholecalciferol 1000 Unit(s) Oral daily  enoxaparin Injectable 40 milliGRAM(s) SubCutaneous daily  hydroxychloroquine 200 milliGRAM(s) Oral every 12 hours  ipratropium 17 MICROgram(s) HFA Inhaler 1 Puff(s) Inhalation every 6 hours  methotrexate 10 milliGRAM(s) Oral every week  predniSONE   Tablet 5 milliGRAM(s) Oral daily    MEDICATIONS  (PRN):  guaiFENesin   Syrup  (Sugar-Free) 100 milliGRAM(s) Oral every 6 hours PRN Cough      OBJECTIVE:  T(C): 36.8 (03-29-20 @ 05:04), Max: 36.8 (03-29-20 @ 05:04)  HR: 95 (03-29-20 @ 05:04) (86 - 99)  BP: 105/70 (03-29-20 @ 05:04) (100/63 - 130/56)  RR: 18 (03-29-20 @ 05:04) (18 - 18)  SpO2: 98% (03-29-20 @ 05:04) (98% - 100%)    PHYSICAL EXAM  GENERAL: NAD   HEAD:  Atraumatic, normocephalic  EYES: PERRLA, sclera clear  CHEST/LUNG: Clear to auscultation bilaterally; no wheeze  HEART: RRR; S1, S2; no M/R/G  ABDOMEN: soft, non-distended; non-tender; BS present  EXTREMITIES:  2+ Peripheral Pulses; no edema  NEURO: non-focal  PSYCH: appropriate affect  SKIN: No rashes or lesions    LABS:  CAPILLARY BLOOD GLUCOSE    I&O's Summary    28 Mar 2020 07:01  -  29 Mar 2020 07:00  --------------------------------------------------------  IN: 120 mL / OUT: 1150 mL / NET: -1030 mL                            10.9   6.66  )-----------( 289      ( 28 Mar 2020 06:50 )             33.6     03-28    132<L>  |  94<L>  |  10  ----------------------------<  92  3.6   |  27  |  0.77    Ca    9.2      28 Mar 2020 06:50  Phos  2.8     03-28  Mg     2.0     03-28    TPro  7.2  /  Alb  3.0<L>  /  TBili  1.0  /  DBili  x   /  AST  33<H>  /  ALT  35<H>  /  AlkPhos  73  03-28              Microbiology:      RADIOLOGY & ADDITIONAL TESTS:    Imaging Personally Reviewed:  Consultant(s) Notes Reviewed:    Care Discussed with Consultants/Other Providers: Guille Benson MD, PGY1  Internal Medicine  [facility]: [phone] / [facility]: [id number]    Patient is a 54y old  Female who presents with a chief complaint of SOB, COVID-19 r/o (28 Mar 2020 06:31)    OVERNIGHT EVENTS/SUBJECTIVE: desated to mid-80s while ambulating    focused ROS as above    MEDICATIONS  (STANDING):  ALBUTerol    90 MICROgram(s) HFA Inhaler 2 Puff(s) Inhalation every 6 hours  azithromycin   Tablet 500 milliGRAM(s) Oral daily  budesonide  80 MICROgram(s)/formoterol 4.5 MICROgram(s) Inhaler 2 Puff(s) Inhalation two times a day  cholecalciferol 1000 Unit(s) Oral daily  enoxaparin Injectable 40 milliGRAM(s) SubCutaneous daily  hydroxychloroquine 200 milliGRAM(s) Oral every 12 hours  ipratropium 17 MICROgram(s) HFA Inhaler 1 Puff(s) Inhalation every 6 hours  methotrexate 10 milliGRAM(s) Oral every week  predniSONE   Tablet 5 milliGRAM(s) Oral daily    MEDICATIONS  (PRN):  guaiFENesin   Syrup  (Sugar-Free) 100 milliGRAM(s) Oral every 6 hours PRN Cough      OBJECTIVE:  T(C): 36.8 (03-29-20 @ 05:04), Max: 36.8 (03-29-20 @ 05:04)  HR: 95 (03-29-20 @ 05:04) (86 - 99)  BP: 105/70 (03-29-20 @ 05:04) (100/63 - 130/56)  RR: 18 (03-29-20 @ 05:04) (18 - 18)  SpO2: 98% (03-29-20 @ 05:04) (98% - 100%)    PHYSICAL EXAM  GENERAL: NAD   HEAD:  Atraumatic, normocephalic  EYES: PERRLA, sclera clear  CHEST/LUNG: Clear to auscultation bilaterally; no wheeze  HEART: RRR; S1, S2; no M/R/G  ABDOMEN: soft, non-distended; non-tender; BS present  EXTREMITIES:  2+ Peripheral Pulses; no edema  NEURO: non-focal  PSYCH: appropriate affect  SKIN: No rashes or lesions    LABS:  CAPILLARY BLOOD GLUCOSE    I&O's Summary    28 Mar 2020 07:01  -  29 Mar 2020 07:00  --------------------------------------------------------  IN: 120 mL / OUT: 1150 mL / NET: -1030 mL                            10.9   6.66  )-----------( 289      ( 28 Mar 2020 06:50 )             33.6     03-28    132<L>  |  94<L>  |  10  ----------------------------<  92  3.6   |  27  |  0.77    Ca    9.2      28 Mar 2020 06:50  Phos  2.8     03-28  Mg     2.0     03-28    TPro  7.2  /  Alb  3.0<L>  /  TBili  1.0  /  DBili  x   /  AST  33<H>  /  ALT  35<H>  /  AlkPhos  73  03-28              Microbiology:      RADIOLOGY & ADDITIONAL TESTS:    Imaging Personally Reviewed:  Consultant(s) Notes Reviewed:    Care Discussed with Consultants/Other Providers: Guille Benson MD, PGY1  Internal Medicine  [facility]: [phone] / [facility]: [id number]    Patient is a 54y old  Female who presents with a chief complaint of SOB, COVID-19 r/o (28 Mar 2020 06:31)    OVERNIGHT EVENTS/SUBJECTIVE: desated to mid-80s while ambulating, felt ok once she rested; this AM, denies SOB, CP, lightheadedness, abd pain, N/V/D/C    focused ROS as above    MEDICATIONS  (STANDING):  ALBUTerol    90 MICROgram(s) HFA Inhaler 2 Puff(s) Inhalation every 6 hours  azithromycin   Tablet 500 milliGRAM(s) Oral daily  budesonide  80 MICROgram(s)/formoterol 4.5 MICROgram(s) Inhaler 2 Puff(s) Inhalation two times a day  cholecalciferol 1000 Unit(s) Oral daily  enoxaparin Injectable 40 milliGRAM(s) SubCutaneous daily  hydroxychloroquine 200 milliGRAM(s) Oral every 12 hours  ipratropium 17 MICROgram(s) HFA Inhaler 1 Puff(s) Inhalation every 6 hours  methotrexate 10 milliGRAM(s) Oral every week  predniSONE   Tablet 5 milliGRAM(s) Oral daily    MEDICATIONS  (PRN):  guaiFENesin   Syrup  (Sugar-Free) 100 milliGRAM(s) Oral every 6 hours PRN Cough    OBJECTIVE:  T(C): 36.8 (03-29-20 @ 05:04), Max: 36.8 (03-29-20 @ 05:04)  HR: 95 (03-29-20 @ 05:04) (86 - 99)  BP: 105/70 (03-29-20 @ 05:04) (100/63 - 130/56)  RR: 18 (03-29-20 @ 05:04) (18 - 18)  SpO2: 98% (03-29-20 @ 05:04) (98% - 100%)    PHYSICAL EXAM  GENERAL: resting in bed, NAD   HEAD:  Atraumatic, normocephalic  EYES: PERRLA, sclera clear  CHEST/LUNG: limited due to body habitus; CTABL; no wheeze  HEART: RRR; S1, S2; no M/R/G  ABDOMEN: soft, non-distended; non-tender; BS present  EXTREMITIES:  2+ Peripheral Pulses; no edema  NEURO: non-focal  PSYCH: appropriate affect  SKIN: No rashes or lesions    LABS:  CAPILLARY BLOOD GLUCOSE    I&O's Summary    28 Mar 2020 07:01  -  29 Mar 2020 07:00  --------------------------------------------------------  IN: 120 mL / OUT: 1150 mL / NET: -1030 mL                          10.9   6.66  )-----------( 289      ( 28 Mar 2020 06:50 )             33.6     03-28    132<L>  |  94<L>  |  10  ----------------------------<  92  3.6   |  27  |  0.77    Ca    9.2      28 Mar 2020 06:50  Phos  2.8     03-28  Mg     2.0     03-28    TPro  7.2  /  Alb  3.0<L>  /  TBili  1.0  /  DBili  x   /  AST  33<H>  /  ALT  35<H>  /  AlkPhos  73  03-28    Microbiology:    RADIOLOGY & ADDITIONAL TESTS:    Imaging Personally Reviewed:  Consultant(s) Notes Reviewed:    Care Discussed with Consultants/Other Providers:

## 2020-03-29 NOTE — PROGRESS NOTE ADULT - PROBLEM SELECTOR PLAN 5
Transitions of Care Status:  1.  Name of PCP: Dr. Kyle Conner, [phone],  2.  PCP Contacted on Admission: [x] Y    [ ] N   --- left  w/ staff  2.1 Direct update given by me to pt's outpt Pulmonologist Dr. Page on 3/25  3.  PCP contacted at Discharge: [ ] Y    [ ] N    [ ] N/A  4.  Post-Discharge Appointment Date and Location:  5.  Summary of Handoff given to PCP:

## 2020-03-30 LAB
ALBUMIN SERPL ELPH-MCNC: 3 G/DL — LOW (ref 3.3–5)
ALP SERPL-CCNC: 70 U/L — SIGNIFICANT CHANGE UP (ref 40–120)
ALT FLD-CCNC: 30 U/L — SIGNIFICANT CHANGE UP (ref 4–33)
ANION GAP SERPL CALC-SCNC: 13 MMO/L — SIGNIFICANT CHANGE UP (ref 7–14)
AST SERPL-CCNC: 30 U/L — SIGNIFICANT CHANGE UP (ref 4–32)
BASOPHILS # BLD AUTO: 0.03 K/UL — SIGNIFICANT CHANGE UP (ref 0–0.2)
BASOPHILS NFR BLD AUTO: 0.5 % — SIGNIFICANT CHANGE UP (ref 0–2)
BILIRUB SERPL-MCNC: 0.8 MG/DL — SIGNIFICANT CHANGE UP (ref 0.2–1.2)
BUN SERPL-MCNC: 9 MG/DL — SIGNIFICANT CHANGE UP (ref 7–23)
CALCIUM SERPL-MCNC: 8.9 MG/DL — SIGNIFICANT CHANGE UP (ref 8.4–10.5)
CHLORIDE SERPL-SCNC: 96 MMOL/L — LOW (ref 98–107)
CO2 SERPL-SCNC: 27 MMOL/L — SIGNIFICANT CHANGE UP (ref 22–31)
CREAT SERPL-MCNC: 0.83 MG/DL — SIGNIFICANT CHANGE UP (ref 0.5–1.3)
CRP SERPL-MCNC: 39.3 MG/L — HIGH
EOSINOPHIL # BLD AUTO: 0.13 K/UL — SIGNIFICANT CHANGE UP (ref 0–0.5)
EOSINOPHIL NFR BLD AUTO: 2.2 % — SIGNIFICANT CHANGE UP (ref 0–6)
GLUCOSE SERPL-MCNC: 78 MG/DL — SIGNIFICANT CHANGE UP (ref 70–99)
HCT VFR BLD CALC: 32.7 % — LOW (ref 34.5–45)
HGB BLD-MCNC: 10.5 G/DL — LOW (ref 11.5–15.5)
IMM GRANULOCYTES NFR BLD AUTO: 4.4 % — HIGH (ref 0–1.5)
LYMPHOCYTES # BLD AUTO: 0.19 K/UL — LOW (ref 1–3.3)
LYMPHOCYTES # BLD AUTO: 3.2 % — LOW (ref 13–44)
MAGNESIUM SERPL-MCNC: 2.1 MG/DL — SIGNIFICANT CHANGE UP (ref 1.6–2.6)
MCHC RBC-ENTMCNC: 30.2 PG — SIGNIFICANT CHANGE UP (ref 27–34)
MCHC RBC-ENTMCNC: 32.1 % — SIGNIFICANT CHANGE UP (ref 32–36)
MCV RBC AUTO: 94 FL — SIGNIFICANT CHANGE UP (ref 80–100)
MONOCYTES # BLD AUTO: 0.28 K/UL — SIGNIFICANT CHANGE UP (ref 0–0.9)
MONOCYTES NFR BLD AUTO: 4.8 % — SIGNIFICANT CHANGE UP (ref 2–14)
NEUTROPHILS # BLD AUTO: 4.97 K/UL — SIGNIFICANT CHANGE UP (ref 1.8–7.4)
NEUTROPHILS NFR BLD AUTO: 84.9 % — HIGH (ref 43–77)
NRBC # FLD: 0 K/UL — SIGNIFICANT CHANGE UP (ref 0–0)
PHOSPHATE SERPL-MCNC: 3 MG/DL — SIGNIFICANT CHANGE UP (ref 2.5–4.5)
PLATELET # BLD AUTO: 281 K/UL — SIGNIFICANT CHANGE UP (ref 150–400)
PMV BLD: 10.6 FL — SIGNIFICANT CHANGE UP (ref 7–13)
POTASSIUM SERPL-MCNC: 3.4 MMOL/L — LOW (ref 3.5–5.3)
POTASSIUM SERPL-SCNC: 3.4 MMOL/L — LOW (ref 3.5–5.3)
PROT SERPL-MCNC: 7.1 G/DL — SIGNIFICANT CHANGE UP (ref 6–8.3)
RBC # BLD: 3.48 M/UL — LOW (ref 3.8–5.2)
RBC # FLD: 12.7 % — SIGNIFICANT CHANGE UP (ref 10.3–14.5)
SODIUM SERPL-SCNC: 136 MMOL/L — SIGNIFICANT CHANGE UP (ref 135–145)
WBC # BLD: 5.86 K/UL — SIGNIFICANT CHANGE UP (ref 3.8–10.5)
WBC # FLD AUTO: 5.86 K/UL — SIGNIFICANT CHANGE UP (ref 3.8–10.5)

## 2020-03-30 PROCEDURE — 93010 ELECTROCARDIOGRAM REPORT: CPT

## 2020-03-30 RX ORDER — ACETAMINOPHEN 500 MG
650 TABLET ORAL ONCE
Refills: 0 | Status: COMPLETED | OUTPATIENT
Start: 2020-03-30 | End: 2020-04-02

## 2020-03-30 RX ORDER — POTASSIUM CHLORIDE 20 MEQ
20 PACKET (EA) ORAL ONCE
Refills: 0 | Status: COMPLETED | OUTPATIENT
Start: 2020-03-30 | End: 2020-03-30

## 2020-03-30 RX ADMIN — Medication 1 PUFF(S): at 00:18

## 2020-03-30 RX ADMIN — Medication 200 MILLIGRAM(S): at 05:28

## 2020-03-30 RX ADMIN — ALBUTEROL 2 PUFF(S): 90 AEROSOL, METERED ORAL at 11:20

## 2020-03-30 RX ADMIN — Medication 100 MILLIGRAM(S): at 13:06

## 2020-03-30 RX ADMIN — Medication 1 PUFF(S): at 05:28

## 2020-03-30 RX ADMIN — Medication 1 PUFF(S): at 23:57

## 2020-03-30 RX ADMIN — ENOXAPARIN SODIUM 40 MILLIGRAM(S): 100 INJECTION SUBCUTANEOUS at 11:20

## 2020-03-30 RX ADMIN — Medication 1000 UNIT(S): at 11:20

## 2020-03-30 RX ADMIN — ALBUTEROL 2 PUFF(S): 90 AEROSOL, METERED ORAL at 18:05

## 2020-03-30 RX ADMIN — BUDESONIDE AND FORMOTEROL FUMARATE DIHYDRATE 2 PUFF(S): 160; 4.5 AEROSOL RESPIRATORY (INHALATION) at 09:06

## 2020-03-30 RX ADMIN — ALBUTEROL 2 PUFF(S): 90 AEROSOL, METERED ORAL at 00:18

## 2020-03-30 RX ADMIN — Medication 1 PUFF(S): at 11:21

## 2020-03-30 RX ADMIN — Medication 5 MILLIGRAM(S): at 05:28

## 2020-03-30 RX ADMIN — ALBUTEROL 2 PUFF(S): 90 AEROSOL, METERED ORAL at 23:57

## 2020-03-30 RX ADMIN — ALBUTEROL 2 PUFF(S): 90 AEROSOL, METERED ORAL at 05:28

## 2020-03-30 RX ADMIN — Medication 1 PUFF(S): at 18:05

## 2020-03-30 RX ADMIN — BUDESONIDE AND FORMOTEROL FUMARATE DIHYDRATE 2 PUFF(S): 160; 4.5 AEROSOL RESPIRATORY (INHALATION) at 21:08

## 2020-03-30 RX ADMIN — Medication 20 MILLIEQUIVALENT(S): at 11:20

## 2020-03-30 RX ADMIN — Medication 100 MILLIGRAM(S): at 05:28

## 2020-03-30 NOTE — PROGRESS NOTE ADULT - PROBLEM SELECTOR PLAN 1
p/w 2 wk hx of productive cough and worsening SOB, found to have b/l lower lobe opacities. Remains on likely 6L NC without prior home oxygen requirement  - COVID-19 positive   - c/w Plaquenil COVID-19 dosing (3/25- ), will resume home Plaquenil for Sarcoidosis afterwards  - goal O2 sat low to mid-90s on 4LNC, will try to titrate down today as tolerated  - previously given Zpack; c/w azithromycin 500 PO QD (3/25- ) x5d  - tessalon perles and cepacol for cough  - pulm recs appreciated  - encourage ambulation

## 2020-03-30 NOTE — PROGRESS NOTE ADULT - PROBLEM SELECTOR PLAN 6
Transitions of Care Status:  1.  Name of PCP: Dr. Kyle Conner, [phone],  2.  PCP Contacted on Admission: [x] Y    [ ] N   --- left  w/ staff  2.1 update given by previous hospitalist to pt's outpt Pulmonologist Dr. Page on 3/25  3.  PCP contacted at Discharge: [ ] Y    [ ] N    [ ] N/A  4.  Post-Discharge Appointment Date and Location:  5.  Summary of Handoff given to PCP:

## 2020-03-30 NOTE — PROGRESS NOTE ADULT - SUBJECTIVE AND OBJECTIVE BOX
Patient is a 54y old  Female who presents with a chief complaint of COVID-19+ (29 Mar 2020 07:05)      SUBJECTIVE / OVERNIGHT EVENTS: No acute events overnight.  Patient noted that she still feels short of breath with productive cough  oxygenation on RA 96% when attempted to get patient out of bed noted to be tachypneic and o2 sat dropped to 88%   OTHERWISE NO chest pain     MEDICATIONS  (STANDING):  acetaminophen   Tablet .. 650 milliGRAM(s) Oral once  ALBUTerol    90 MICROgram(s) HFA Inhaler 2 Puff(s) Inhalation every 6 hours  budesonide  80 MICROgram(s)/formoterol 4.5 MICROgram(s) Inhaler 2 Puff(s) Inhalation two times a day  cholecalciferol 1000 Unit(s) Oral daily  enoxaparin Injectable 40 milliGRAM(s) SubCutaneous daily  ipratropium 17 MICROgram(s) HFA Inhaler 1 Puff(s) Inhalation every 6 hours  methotrexate 10 milliGRAM(s) Oral every week  predniSONE   Tablet 5 milliGRAM(s) Oral daily    MEDICATIONS  (PRN):  guaiFENesin   Syrup  (Sugar-Free) 100 milliGRAM(s) Oral every 6 hours PRN Cough      T(C): 36.8 (03-30-20 @ 10:32), Max: 37.2 (03-29-20 @ 21:43)  HR: 61 (03-30-20 @ 10:32) (61 - 91)  BP: 118/65 (03-30-20 @ 10:32) (102/80 - 118/65)  RR: 19 (03-30-20 @ 10:32) (19 - 19)  SpO2: 98% (03-30-20 @ 10:32) (98% - 98%)  CAPILLARY BLOOD GLUCOSE        I&O's Summary    29 Mar 2020 07:01  -  30 Mar 2020 07:00  --------------------------------------------------------  IN: 1170 mL / OUT: 1150 mL / NET: 20 mL    30 Mar 2020 07:01  -  30 Mar 2020 20:28  --------------------------------------------------------  IN: 0 mL / OUT: 200 mL / NET: -200 mL        PHYSICAL EXAM:  GENERAL: not in distress, on room air  EYES: open, sclera clear b/l  CHEST/LUNG:  + tachypneic, speaking in full sentences without difficulty; rales appreciated   HEART: Not tachycardic, no lower extremity edema b/l  ABDOMEN: Soft, Nontender, Nondistended  EXTREMITIES: Warm and well perfused  NEUROLOGY: awake, alert, responds to Qs appropriately, no gross deficits  PSYCH: calm, cooperative  SKIN: No visible rashes or lesions    LABS:                        10.5   5.86  )-----------( 281      ( 30 Mar 2020 06:05 )             32.7     03-30    136  |  96<L>  |  9   ----------------------------<  78  3.4<L>   |  27  |  0.83    Ca    8.9      30 Mar 2020 06:05  Phos  3.0     03-30  Mg     2.1     03-30    TPro  7.1  /  Alb  3.0<L>  /  TBili  0.8  /  DBili  x   /  AST  30  /  ALT  30  /  AlkPhos  70  03-30              RADIOLOGY & ADDITIONAL TESTS:

## 2020-03-31 LAB
ALBUMIN SERPL ELPH-MCNC: 3 G/DL — LOW (ref 3.3–5)
ALP SERPL-CCNC: 68 U/L — SIGNIFICANT CHANGE UP (ref 40–120)
ALT FLD-CCNC: 36 U/L — HIGH (ref 4–33)
ANION GAP SERPL CALC-SCNC: 11 MMO/L — SIGNIFICANT CHANGE UP (ref 7–14)
AST SERPL-CCNC: 27 U/L — SIGNIFICANT CHANGE UP (ref 4–32)
BASOPHILS # BLD AUTO: 0.04 K/UL — SIGNIFICANT CHANGE UP (ref 0–0.2)
BASOPHILS NFR BLD AUTO: 0.7 % — SIGNIFICANT CHANGE UP (ref 0–2)
BILIRUB SERPL-MCNC: 0.7 MG/DL — SIGNIFICANT CHANGE UP (ref 0.2–1.2)
BUN SERPL-MCNC: 7 MG/DL — SIGNIFICANT CHANGE UP (ref 7–23)
CALCIUM SERPL-MCNC: 9.1 MG/DL — SIGNIFICANT CHANGE UP (ref 8.4–10.5)
CHLORIDE SERPL-SCNC: 100 MMOL/L — SIGNIFICANT CHANGE UP (ref 98–107)
CO2 SERPL-SCNC: 27 MMOL/L — SIGNIFICANT CHANGE UP (ref 22–31)
CREAT SERPL-MCNC: 0.81 MG/DL — SIGNIFICANT CHANGE UP (ref 0.5–1.3)
EOSINOPHIL # BLD AUTO: 0.15 K/UL — SIGNIFICANT CHANGE UP (ref 0–0.5)
EOSINOPHIL NFR BLD AUTO: 2.8 % — SIGNIFICANT CHANGE UP (ref 0–6)
GLUCOSE SERPL-MCNC: 92 MG/DL — SIGNIFICANT CHANGE UP (ref 70–99)
HCT VFR BLD CALC: 32 % — LOW (ref 34.5–45)
HGB BLD-MCNC: 10.4 G/DL — LOW (ref 11.5–15.5)
IMM GRANULOCYTES NFR BLD AUTO: 3.9 % — HIGH (ref 0–1.5)
LYMPHOCYTES # BLD AUTO: 0.2 K/UL — LOW (ref 1–3.3)
LYMPHOCYTES # BLD AUTO: 3.7 % — LOW (ref 13–44)
MAGNESIUM SERPL-MCNC: 1.8 MG/DL — SIGNIFICANT CHANGE UP (ref 1.6–2.6)
MCHC RBC-ENTMCNC: 30.8 PG — SIGNIFICANT CHANGE UP (ref 27–34)
MCHC RBC-ENTMCNC: 32.5 % — SIGNIFICANT CHANGE UP (ref 32–36)
MCV RBC AUTO: 94.7 FL — SIGNIFICANT CHANGE UP (ref 80–100)
MONOCYTES # BLD AUTO: 0.31 K/UL — SIGNIFICANT CHANGE UP (ref 0–0.9)
MONOCYTES NFR BLD AUTO: 5.7 % — SIGNIFICANT CHANGE UP (ref 2–14)
NEUTROPHILS # BLD AUTO: 4.49 K/UL — SIGNIFICANT CHANGE UP (ref 1.8–7.4)
NEUTROPHILS NFR BLD AUTO: 83.2 % — HIGH (ref 43–77)
NRBC # FLD: 0 K/UL — SIGNIFICANT CHANGE UP (ref 0–0)
PHOSPHATE SERPL-MCNC: 3 MG/DL — SIGNIFICANT CHANGE UP (ref 2.5–4.5)
PLATELET # BLD AUTO: 263 K/UL — SIGNIFICANT CHANGE UP (ref 150–400)
PMV BLD: 10.9 FL — SIGNIFICANT CHANGE UP (ref 7–13)
POTASSIUM SERPL-MCNC: 3.6 MMOL/L — SIGNIFICANT CHANGE UP (ref 3.5–5.3)
POTASSIUM SERPL-SCNC: 3.6 MMOL/L — SIGNIFICANT CHANGE UP (ref 3.5–5.3)
PROT SERPL-MCNC: 6.3 G/DL — SIGNIFICANT CHANGE UP (ref 6–8.3)
RBC # BLD: 3.38 M/UL — LOW (ref 3.8–5.2)
RBC # FLD: 12.7 % — SIGNIFICANT CHANGE UP (ref 10.3–14.5)
SODIUM SERPL-SCNC: 138 MMOL/L — SIGNIFICANT CHANGE UP (ref 135–145)
WBC # BLD: 5.4 K/UL — SIGNIFICANT CHANGE UP (ref 3.8–10.5)
WBC # FLD AUTO: 5.4 K/UL — SIGNIFICANT CHANGE UP (ref 3.8–10.5)

## 2020-03-31 RX ORDER — MAGNESIUM SULFATE 500 MG/ML
1 VIAL (ML) INJECTION ONCE
Refills: 0 | Status: COMPLETED | OUTPATIENT
Start: 2020-03-31 | End: 2020-03-31

## 2020-03-31 RX ORDER — HYDROXYCHLOROQUINE SULFATE 200 MG
400 TABLET ORAL DAILY
Refills: 0 | Status: DISCONTINUED | OUTPATIENT
Start: 2020-03-31 | End: 2020-04-06

## 2020-03-31 RX ORDER — POTASSIUM CHLORIDE 20 MEQ
20 PACKET (EA) ORAL ONCE
Refills: 0 | Status: COMPLETED | OUTPATIENT
Start: 2020-03-31 | End: 2020-03-31

## 2020-03-31 RX ADMIN — BUDESONIDE AND FORMOTEROL FUMARATE DIHYDRATE 2 PUFF(S): 160; 4.5 AEROSOL RESPIRATORY (INHALATION) at 09:37

## 2020-03-31 RX ADMIN — ENOXAPARIN SODIUM 40 MILLIGRAM(S): 100 INJECTION SUBCUTANEOUS at 11:11

## 2020-03-31 RX ADMIN — ALBUTEROL 2 PUFF(S): 90 AEROSOL, METERED ORAL at 18:09

## 2020-03-31 RX ADMIN — Medication 100 MILLIGRAM(S): at 18:09

## 2020-03-31 RX ADMIN — Medication 400 MILLIGRAM(S): at 15:25

## 2020-03-31 RX ADMIN — BUDESONIDE AND FORMOTEROL FUMARATE DIHYDRATE 2 PUFF(S): 160; 4.5 AEROSOL RESPIRATORY (INHALATION) at 21:33

## 2020-03-31 RX ADMIN — ALBUTEROL 2 PUFF(S): 90 AEROSOL, METERED ORAL at 11:10

## 2020-03-31 RX ADMIN — Medication 20 MILLIEQUIVALENT(S): at 11:11

## 2020-03-31 RX ADMIN — ALBUTEROL 2 PUFF(S): 90 AEROSOL, METERED ORAL at 05:32

## 2020-03-31 RX ADMIN — Medication 1 PUFF(S): at 11:12

## 2020-03-31 RX ADMIN — Medication 1000 UNIT(S): at 11:11

## 2020-03-31 RX ADMIN — Medication 5 MILLIGRAM(S): at 05:33

## 2020-03-31 RX ADMIN — ALBUTEROL 2 PUFF(S): 90 AEROSOL, METERED ORAL at 23:37

## 2020-03-31 RX ADMIN — Medication 1 PUFF(S): at 18:08

## 2020-03-31 RX ADMIN — Medication 1 PUFF(S): at 05:33

## 2020-03-31 RX ADMIN — Medication 1 PUFF(S): at 23:38

## 2020-03-31 RX ADMIN — Medication 100 GRAM(S): at 15:49

## 2020-03-31 NOTE — DIETITIAN INITIAL EVALUATION ADULT. - OTHER INFO
55 y/o with checo 55 y/o with sarcoidosis (dx 2017; on methotrexate and hydroxychloroquine) presenting with 2 weeks history of cough producing whitish sputum now w/ worsening SOB per chart review. +COVID-19 3/24/20. RD unable to have face to face encounter with patient to perform nutrition interview and/or nutrition-focused physical exam due to contact/isolation precautions related to COVID-19 and mitigation effort. Unable to obtain weight and/or previous diet history. Patient tolerating diet, drinking fluids per chart review. No nausea/vomiting/diarrhea/constipation or difficulty chewing and swallowing noted. NKFA. 55 y/o with sarcoidosis (dx 2017; on methotrexate and hydroxychloroquine) presenting with 2 weeks history of cough producing whitish sputum now w/ worsening SOB per chart review. +COVID-19 3/24/20. RD unable to have face to face encounter with patient to perform nutrition interview and/or nutrition-focused physical exam due to contact/isolation precautions related to COVID-19 and mitigation effort. Unable to obtain weight and/or previous diet history. Patient tolerating diet, drinking fluids per chart review. No nausea/vomiting/diarrhea/constipation or difficulty chewing and swallowing noted. NKFA. Attempted to reach patient via phone but remain unsuccessful at this time. Overall, lack of subjective data at this time, unable to obtain prior weight history and/or diet history. Encourage good po intake.

## 2020-03-31 NOTE — DIETITIAN INITIAL EVALUATION ADULT. - CONTINUE CURRENT NUTRITION CARE PLAN
1. Continue Regular diet 2. Monitor weights, labs, BM's, skin integrity, p.o. intake. 3. Please Encourage po intake, assist with meals and menu selections, provide alternatives PRN.

## 2020-03-31 NOTE — DIETITIAN INITIAL EVALUATION ADULT. - PERTINENT LABORATORY DATA
03-31 Na138 mmol/L Glu 92 mg/dL K+ 3.6 mmol/L Cr  0.81 mg/dL BUN 7 mg/dL 03-31 Phos 3.0 mg/dL 03-31 Alb 3.0 g/dL<L>

## 2020-03-31 NOTE — DIETITIAN INITIAL EVALUATION ADULT. - PROBLEM SELECTOR PLAN 5
Transitions of Care Status:  1.  Name of PCP: Dr. Kyle Conner, [phone],  2.  PCP Contacted on Admission: [x] Y    [ ] N   --- left VM w/ staff  2.1 Pulmonology Dr. Page's office contacted on admission, office closed, will try to reach out again tomorrow when office opens.   3.  PCP contacted at Discharge: [ ] Y    [ ] N    [ ] N/A  4.  Post-Discharge Appointment Date and Location:  5.  Summary of Handoff given to PCP:

## 2020-03-31 NOTE — DIETITIAN INITIAL EVALUATION ADULT. - PERTINENT MEDS FT
MEDICATIONS  (STANDING):  acetaminophen   Tablet .. 650 milliGRAM(s) Oral once  ALBUTerol    90 MICROgram(s) HFA Inhaler 2 Puff(s) Inhalation every 6 hours  budesonide  80 MICROgram(s)/formoterol 4.5 MICROgram(s) Inhaler 2 Puff(s) Inhalation two times a day  cholecalciferol 1000 Unit(s) Oral daily  enoxaparin Injectable 40 milliGRAM(s) SubCutaneous daily  ipratropium 17 MICROgram(s) HFA Inhaler 1 Puff(s) Inhalation every 6 hours  methotrexate 10 milliGRAM(s) Oral every week  predniSONE   Tablet 5 milliGRAM(s) Oral daily    MEDICATIONS  (PRN):  guaiFENesin   Syrup  (Sugar-Free) 100 milliGRAM(s) Oral every 6 hours PRN Cough

## 2020-03-31 NOTE — DIETITIAN INITIAL EVALUATION ADULT. - ENERGY NEEDS
Weight 99.8kg/220lbs (3/27) Height 66" BMI=35.5kg/m2  IBW: 130lbs (+/-10%) %IBW: 169%  Estimated Energy Needs (11-14kcal/kg of ABW): 1098-1397kcal/day

## 2020-03-31 NOTE — PROGRESS NOTE ADULT - PROBLEM SELECTOR PLAN 1
p/w 2 wk hx of productive cough and worsening SOB, found to have b/l lower lobe opacities. Remains on likely 6L NC without prior home oxygen requirement  - COVID-19 positive   - c/w Plaquenil COVID-19 dosing (3/25- ), will resume home Plaquenil for Sarcoidosis afterwards  - goal O2 sat low to mid-90s on 4LNC titrated down to 3L NC, will try to titrate down today as tolerated  - previously given Zpack; c/w azithromycin 500 PO QD (3/25- ) x5d  - tessalon perles and cepacol for cough  - pulm recs appreciated  - encourage ambulation

## 2020-03-31 NOTE — DIETITIAN INITIAL EVALUATION ADULT. - PROBLEM SELECTOR PLAN 3
dx in 2017 via lung bx on maintenance mtx and hydroxychloroquine; prednisone reduced recently to 5mg PO QD  - will hold MTX in setting of infection  - c/w home Plaquenil  - c/w home Prednisone

## 2020-03-31 NOTE — PROGRESS NOTE ADULT - PROBLEM SELECTOR PLAN 6
Transitions of Care Status:  1.  Name of PCP: Dr. Kyle Conner, [phone],  2.  PCP Contacted on Admission: [x] Y    [ ] N   --- left  w/ staff  2.1 update given by previous hospitalist to pt's outpt Pulmonologist Dr. Page on 3/25  3.  PCP contacted at Discharge: [ ] Y    [ ] N    [ ] N/A  4.  Post-Discharge Appointment Date and Location:  5.  Summary of Handoff given to PCP:

## 2020-03-31 NOTE — PROGRESS NOTE ADULT - PROBLEM SELECTOR PLAN 4
dx in 2017 via lung bx on maintenance mtx and hydroxychloroquine; prednisone reduced recently to 5mg PO QD  - per Pulm, can c/w MTX weekly  - started Plaquenil at COVID-19 reg, on home dose of Plaquenil 400mg po q daily  - c/w home Prednisone  - as per o/p PULM Dr. Page  to obtain additional pt hx.  Pt has mild obstructive dx on mtx qweekly and plaquenil.  Per Dr. Page, TTE in July 2019 did not show pHTN; his plan was for repeat echo this year.

## 2020-03-31 NOTE — PROGRESS NOTE ADULT - SUBJECTIVE AND OBJECTIVE BOX
Patient is a 54y old  Female who presents with a chief complaint of SOB, COVID-19 r/o (30 Mar 2020 20:28)      SUBJECTIVE / OVERNIGHT EVENTS:   Patient clinically improving stating her shortness of breath has improved although she is on 4 L NC currenlty and saturating > 94%   No acute events overnight.    MEDICATIONS  (STANDING):  acetaminophen   Tablet .. 650 milliGRAM(s) Oral once  ALBUTerol    90 MICROgram(s) HFA Inhaler 2 Puff(s) Inhalation every 6 hours  budesonide  80 MICROgram(s)/formoterol 4.5 MICROgram(s) Inhaler 2 Puff(s) Inhalation two times a day  cholecalciferol 1000 Unit(s) Oral daily  enoxaparin Injectable 40 milliGRAM(s) SubCutaneous daily  hydroxychloroquine 400 milliGRAM(s) Oral daily  ipratropium 17 MICROgram(s) HFA Inhaler 1 Puff(s) Inhalation every 6 hours  methotrexate 10 milliGRAM(s) Oral every week  predniSONE   Tablet 5 milliGRAM(s) Oral daily    MEDICATIONS  (PRN):  guaiFENesin   Syrup  (Sugar-Free) 100 milliGRAM(s) Oral every 6 hours PRN Cough      T(C): 36.6 (03-31-20 @ 11:20), Max: 36.7 (03-30-20 @ 22:19)  HR: 96 (03-31-20 @ 11:20) (88 - 96)  BP: 106/71 (03-31-20 @ 11:20) (106/71 - 120/74)  RR: 18 (03-31-20 @ 11:20) (18 - 19)  SpO2: 94% (03-31-20 @ 11:20) (91% - 94%)  CAPILLARY BLOOD GLUCOSE        I&O's Summary    30 Mar 2020 07:01  -  31 Mar 2020 07:00  --------------------------------------------------------  IN: 0 mL / OUT: 200 mL / NET: -200 mL    31 Mar 2020 07:01  -  31 Mar 2020 19:19  --------------------------------------------------------  IN: 240 mL / OUT: 400 mL / NET: -160 mL        PHYSICAL EXAM:  GENERAL: not in distress, on nasal canula  EYES: open, sclera clear b/l  CHEST/LUNG: normal respiratory effort, not tachypneic, speaking in full sentences without difficulty  HEART: Not tachycardic, no lower extremity edema b/l  ABDOMEN: Soft, Nontender, Nondistended  EXTREMITIES: Warm and well perfused  NEUROLOGY: awake, alert, responds to Qs appropriately, no gross deficits  PSYCH: calm, cooperative  SKIN: No visible rashes or lesions    LABS:                        10.4   5.40  )-----------( 263      ( 31 Mar 2020 06:20 )             32.0     03-31    138  |  100  |  7   ----------------------------<  92  3.6   |  27  |  0.81    Ca    9.1      31 Mar 2020 06:20  Phos  3.0     03-31  Mg     1.8     03-31    TPro  6.3  /  Alb  3.0<L>  /  TBili  0.7  /  DBili  x   /  AST  27  /  ALT  36<H>  /  AlkPhos  68  03-31              RADIOLOGY & ADDITIONAL TESTS:

## 2020-04-01 LAB
ALBUMIN SERPL ELPH-MCNC: 2.8 G/DL — LOW (ref 3.3–5)
ALP SERPL-CCNC: 68 U/L — SIGNIFICANT CHANGE UP (ref 40–120)
ALT FLD-CCNC: 33 U/L — SIGNIFICANT CHANGE UP (ref 4–33)
ANION GAP SERPL CALC-SCNC: 10 MMO/L — SIGNIFICANT CHANGE UP (ref 7–14)
AST SERPL-CCNC: 31 U/L — SIGNIFICANT CHANGE UP (ref 4–32)
BASOPHILS # BLD AUTO: 0.03 K/UL — SIGNIFICANT CHANGE UP (ref 0–0.2)
BASOPHILS NFR BLD AUTO: 0.6 % — SIGNIFICANT CHANGE UP (ref 0–2)
BILIRUB SERPL-MCNC: 0.6 MG/DL — SIGNIFICANT CHANGE UP (ref 0.2–1.2)
BUN SERPL-MCNC: 6 MG/DL — LOW (ref 7–23)
CALCIUM SERPL-MCNC: 9.2 MG/DL — SIGNIFICANT CHANGE UP (ref 8.4–10.5)
CHLORIDE SERPL-SCNC: 102 MMOL/L — SIGNIFICANT CHANGE UP (ref 98–107)
CO2 SERPL-SCNC: 25 MMOL/L — SIGNIFICANT CHANGE UP (ref 22–31)
CREAT SERPL-MCNC: 0.75 MG/DL — SIGNIFICANT CHANGE UP (ref 0.5–1.3)
CRP SERPL-MCNC: 17.3 MG/L — HIGH
EOSINOPHIL # BLD AUTO: 0.1 K/UL — SIGNIFICANT CHANGE UP (ref 0–0.5)
EOSINOPHIL NFR BLD AUTO: 2 % — SIGNIFICANT CHANGE UP (ref 0–6)
FERRITIN SERPL-MCNC: 266.7 NG/ML — HIGH (ref 15–150)
GLUCOSE SERPL-MCNC: 82 MG/DL — SIGNIFICANT CHANGE UP (ref 70–99)
HCT VFR BLD CALC: 32.4 % — LOW (ref 34.5–45)
HGB BLD-MCNC: 10.4 G/DL — LOW (ref 11.5–15.5)
IMM GRANULOCYTES NFR BLD AUTO: 3.1 % — HIGH (ref 0–1.5)
LDH SERPL L TO P-CCNC: 297 U/L — HIGH (ref 135–225)
LYMPHOCYTES # BLD AUTO: 0.26 K/UL — LOW (ref 1–3.3)
LYMPHOCYTES # BLD AUTO: 5.3 % — LOW (ref 13–44)
MAGNESIUM SERPL-MCNC: 2.1 MG/DL — SIGNIFICANT CHANGE UP (ref 1.6–2.6)
MCHC RBC-ENTMCNC: 30.8 PG — SIGNIFICANT CHANGE UP (ref 27–34)
MCHC RBC-ENTMCNC: 32.1 % — SIGNIFICANT CHANGE UP (ref 32–36)
MCV RBC AUTO: 95.9 FL — SIGNIFICANT CHANGE UP (ref 80–100)
MONOCYTES # BLD AUTO: 0.26 K/UL — SIGNIFICANT CHANGE UP (ref 0–0.9)
MONOCYTES NFR BLD AUTO: 5.3 % — SIGNIFICANT CHANGE UP (ref 2–14)
NEUTROPHILS # BLD AUTO: 4.08 K/UL — SIGNIFICANT CHANGE UP (ref 1.8–7.4)
NEUTROPHILS NFR BLD AUTO: 83.7 % — HIGH (ref 43–77)
NRBC # FLD: 0 K/UL — SIGNIFICANT CHANGE UP (ref 0–0)
PHOSPHATE SERPL-MCNC: 3.3 MG/DL — SIGNIFICANT CHANGE UP (ref 2.5–4.5)
PLATELET # BLD AUTO: 211 K/UL — SIGNIFICANT CHANGE UP (ref 150–400)
PMV BLD: 11.9 FL — SIGNIFICANT CHANGE UP (ref 7–13)
POTASSIUM SERPL-MCNC: 4.1 MMOL/L — SIGNIFICANT CHANGE UP (ref 3.5–5.3)
POTASSIUM SERPL-SCNC: 4.1 MMOL/L — SIGNIFICANT CHANGE UP (ref 3.5–5.3)
PROT SERPL-MCNC: 6.5 G/DL — SIGNIFICANT CHANGE UP (ref 6–8.3)
RBC # BLD: 3.38 M/UL — LOW (ref 3.8–5.2)
RBC # FLD: 12.9 % — SIGNIFICANT CHANGE UP (ref 10.3–14.5)
SODIUM SERPL-SCNC: 137 MMOL/L — SIGNIFICANT CHANGE UP (ref 135–145)
WBC # BLD: 4.88 K/UL — SIGNIFICANT CHANGE UP (ref 3.8–10.5)
WBC # FLD AUTO: 4.88 K/UL — SIGNIFICANT CHANGE UP (ref 3.8–10.5)

## 2020-04-01 PROCEDURE — 93010 ELECTROCARDIOGRAM REPORT: CPT

## 2020-04-01 PROCEDURE — 99233 SBSQ HOSP IP/OBS HIGH 50: CPT

## 2020-04-01 RX ORDER — METHOTREXATE 2.5 MG/1
10 TABLET ORAL
Refills: 0 | Status: DISCONTINUED | OUTPATIENT
Start: 2020-04-01 | End: 2020-04-06

## 2020-04-01 RX ADMIN — BUDESONIDE AND FORMOTEROL FUMARATE DIHYDRATE 2 PUFF(S): 160; 4.5 AEROSOL RESPIRATORY (INHALATION) at 11:30

## 2020-04-01 RX ADMIN — METHOTREXATE 10 MILLIGRAM(S): 2.5 TABLET ORAL at 22:24

## 2020-04-01 RX ADMIN — Medication 100 MILLIGRAM(S): at 11:34

## 2020-04-01 RX ADMIN — BUDESONIDE AND FORMOTEROL FUMARATE DIHYDRATE 2 PUFF(S): 160; 4.5 AEROSOL RESPIRATORY (INHALATION) at 22:05

## 2020-04-01 RX ADMIN — ENOXAPARIN SODIUM 40 MILLIGRAM(S): 100 INJECTION SUBCUTANEOUS at 11:30

## 2020-04-01 RX ADMIN — ALBUTEROL 2 PUFF(S): 90 AEROSOL, METERED ORAL at 17:54

## 2020-04-01 RX ADMIN — Medication 1 PUFF(S): at 07:14

## 2020-04-01 RX ADMIN — Medication 1 PUFF(S): at 17:53

## 2020-04-01 RX ADMIN — Medication 1000 UNIT(S): at 11:29

## 2020-04-01 RX ADMIN — Medication 1 PUFF(S): at 11:30

## 2020-04-01 RX ADMIN — ALBUTEROL 2 PUFF(S): 90 AEROSOL, METERED ORAL at 07:14

## 2020-04-01 RX ADMIN — ALBUTEROL 2 PUFF(S): 90 AEROSOL, METERED ORAL at 11:30

## 2020-04-01 RX ADMIN — Medication 5 MILLIGRAM(S): at 04:49

## 2020-04-01 RX ADMIN — Medication 400 MILLIGRAM(S): at 11:29

## 2020-04-01 NOTE — PROGRESS NOTE ADULT - PROBLEM SELECTOR PLAN 5
Transitions of Care Status:  1.  Name of PCP: Dr. Kyle Conner, [phone],  2.  PCP Contacted on Admission: [x] Y    [ ] N   --- left  w/ staff  2.1 update given by previous hospitalist to pt's outpt Pulmonologist Dr. Page on 3/25  3.  PCP contacted at Discharge: [ ] Y    [ ] N    [ ] N/A  4.  Post-Discharge Appointment Date and Location:  5.  Summary of Handoff given to PCP:

## 2020-04-01 NOTE — PROGRESS NOTE ADULT - SUBJECTIVE AND OBJECTIVE BOX
Patient is a 54y old  Female who presents with a chief complaint of SOB, COVID-19 r/o (31 Mar 2020 19:16)      SUBJECTIVE / OVERNIGHT EVENTS: No acute events overnight. Still reports cough with clear phlegm and SOB with exertion. No chest pain, N/V/D    MEDICATIONS  (STANDING):  acetaminophen   Tablet .. 650 milliGRAM(s) Oral once  ALBUTerol    90 MICROgram(s) HFA Inhaler 2 Puff(s) Inhalation every 6 hours  budesonide  80 MICROgram(s)/formoterol 4.5 MICROgram(s) Inhaler 2 Puff(s) Inhalation two times a day  cholecalciferol 1000 Unit(s) Oral daily  enoxaparin Injectable 40 milliGRAM(s) SubCutaneous daily  hydroxychloroquine 400 milliGRAM(s) Oral daily  ipratropium 17 MICROgram(s) HFA Inhaler 1 Puff(s) Inhalation every 6 hours  methotrexate 10 milliGRAM(s) Oral every week  predniSONE   Tablet 5 milliGRAM(s) Oral daily    MEDICATIONS  (PRN):  guaiFENesin   Syrup  (Sugar-Free) 100 milliGRAM(s) Oral every 6 hours PRN Cough      T(C): 36.7 (04-01-20 @ 11:01), Max: 36.7 (04-01-20 @ 11:01)  HR: 98 (04-01-20 @ 11:01) (73 - 98)  BP: 116/50 (04-01-20 @ 11:01) (116/50 - 117/83)  RR: 20 (04-01-20 @ 11:01) (18 - 20)  SpO2: 97% (04-01-20 @ 11:01) (93% - 98%)  CAPILLARY BLOOD GLUCOSE        I&O's Summary    31 Mar 2020 07:01  -  01 Apr 2020 07:00  --------------------------------------------------------  IN: 440 mL / OUT: 700 mL / NET: -260 mL    01 Apr 2020 07:01  -  01 Apr 2020 13:59  --------------------------------------------------------  IN: 240 mL / OUT: 400 mL / NET: -160 mL        PHYSICAL EXAM:  GENERAL: not in distress, on 2L NC  EYES: open, sclera clear b/l  CHEST/LUNG: normal respiratory effort, not tachypneic, speaking in full sentences without difficulty  HEART: no lower extremity edema b/l  ABDOMEN: Soft, Nontender, Nondistended  EXTREMITIES: Warm and well perfused  NEUROLOGY: awake, alert, responds to Qs appropriately, no gross deficits  PSYCH: calm, cooperative  SKIN: No visible rashes or lesions    LABS:                        10.4   4.88  )-----------( 211      ( 01 Apr 2020 06:30 )             32.4     04-01    137  |  102  |  6<L>  ----------------------------<  82  4.1   |  25  |  0.75    Ca    9.2      01 Apr 2020 06:30  Phos  3.3     04-01  Mg     2.1     04-01    TPro  6.5  /  Alb  2.8<L>  /  TBili  0.6  /  DBili  x   /  AST  31  /  ALT  33  /  AlkPhos  68  04-01              RADIOLOGY & ADDITIONAL TESTS:

## 2020-04-01 NOTE — PROGRESS NOTE ADULT - PROBLEM SELECTOR PLAN 1
- COVID-19 positive, sepsis on admission: tachycardia, tachypnea, and leukocytosis   - s/p Plaquenil COVID-19 dosing (3/25-3/30), now on home dose Plaquenil for Sarcoidosis   titrate off NC as tolerated, now on 2L NC, check o2 sat with ambulation  - s/p 5 days azithromycin 500 PO QD  - tessalon Perles and cepacol for cough  - pulm recs appreciated  - encourage ambulation

## 2020-04-01 NOTE — PROGRESS NOTE ADULT - PROBLEM SELECTOR PROBLEM 5
Discharge planning issues
Prophylactic measure
Prophylactic measure
Discharge planning issues

## 2020-04-02 LAB
ALBUMIN SERPL ELPH-MCNC: 3 G/DL — LOW (ref 3.3–5)
ALP SERPL-CCNC: 71 U/L — SIGNIFICANT CHANGE UP (ref 40–120)
ALT FLD-CCNC: 38 U/L — HIGH (ref 4–33)
ANION GAP SERPL CALC-SCNC: 10 MMO/L — SIGNIFICANT CHANGE UP (ref 7–14)
AST SERPL-CCNC: 35 U/L — HIGH (ref 4–32)
BASOPHILS # BLD AUTO: 0.03 K/UL — SIGNIFICANT CHANGE UP (ref 0–0.2)
BASOPHILS NFR BLD AUTO: 0.7 % — SIGNIFICANT CHANGE UP (ref 0–2)
BILIRUB SERPL-MCNC: 0.6 MG/DL — SIGNIFICANT CHANGE UP (ref 0.2–1.2)
BUN SERPL-MCNC: 7 MG/DL — SIGNIFICANT CHANGE UP (ref 7–23)
CALCIUM SERPL-MCNC: 9.3 MG/DL — SIGNIFICANT CHANGE UP (ref 8.4–10.5)
CHLORIDE SERPL-SCNC: 102 MMOL/L — SIGNIFICANT CHANGE UP (ref 98–107)
CO2 SERPL-SCNC: 26 MMOL/L — SIGNIFICANT CHANGE UP (ref 22–31)
CREAT SERPL-MCNC: 0.86 MG/DL — SIGNIFICANT CHANGE UP (ref 0.5–1.3)
CRP SERPL-MCNC: 13.6 MG/L — HIGH
EOSINOPHIL # BLD AUTO: 0.12 K/UL — SIGNIFICANT CHANGE UP (ref 0–0.5)
EOSINOPHIL NFR BLD AUTO: 2.7 % — SIGNIFICANT CHANGE UP (ref 0–6)
FERRITIN SERPL-MCNC: 233.6 NG/ML — HIGH (ref 15–150)
GLUCOSE SERPL-MCNC: 79 MG/DL — SIGNIFICANT CHANGE UP (ref 70–99)
HCT VFR BLD CALC: 32.9 % — LOW (ref 34.5–45)
HGB BLD-MCNC: 10.7 G/DL — LOW (ref 11.5–15.5)
IMM GRANULOCYTES NFR BLD AUTO: 2.5 % — HIGH (ref 0–1.5)
LDH SERPL L TO P-CCNC: 308 U/L — HIGH (ref 135–225)
LYMPHOCYTES # BLD AUTO: 0.32 K/UL — LOW (ref 1–3.3)
LYMPHOCYTES # BLD AUTO: 7.2 % — LOW (ref 13–44)
MAGNESIUM SERPL-MCNC: 1.9 MG/DL — SIGNIFICANT CHANGE UP (ref 1.6–2.6)
MCHC RBC-ENTMCNC: 31.4 PG — SIGNIFICANT CHANGE UP (ref 27–34)
MCHC RBC-ENTMCNC: 32.5 % — SIGNIFICANT CHANGE UP (ref 32–36)
MCV RBC AUTO: 96.5 FL — SIGNIFICANT CHANGE UP (ref 80–100)
MONOCYTES # BLD AUTO: 0.31 K/UL — SIGNIFICANT CHANGE UP (ref 0–0.9)
MONOCYTES NFR BLD AUTO: 7 % — SIGNIFICANT CHANGE UP (ref 2–14)
NEUTROPHILS # BLD AUTO: 3.53 K/UL — SIGNIFICANT CHANGE UP (ref 1.8–7.4)
NEUTROPHILS NFR BLD AUTO: 79.9 % — HIGH (ref 43–77)
NRBC # FLD: 0 K/UL — SIGNIFICANT CHANGE UP (ref 0–0)
PHOSPHATE SERPL-MCNC: 3.7 MG/DL — SIGNIFICANT CHANGE UP (ref 2.5–4.5)
PLATELET # BLD AUTO: 264 K/UL — SIGNIFICANT CHANGE UP (ref 150–400)
PMV BLD: 11.4 FL — SIGNIFICANT CHANGE UP (ref 7–13)
POTASSIUM SERPL-MCNC: 4.1 MMOL/L — SIGNIFICANT CHANGE UP (ref 3.5–5.3)
POTASSIUM SERPL-SCNC: 4.1 MMOL/L — SIGNIFICANT CHANGE UP (ref 3.5–5.3)
PROT SERPL-MCNC: 6.5 G/DL — SIGNIFICANT CHANGE UP (ref 6–8.3)
RBC # BLD: 3.41 M/UL — LOW (ref 3.8–5.2)
RBC # FLD: 13.1 % — SIGNIFICANT CHANGE UP (ref 10.3–14.5)
SODIUM SERPL-SCNC: 138 MMOL/L — SIGNIFICANT CHANGE UP (ref 135–145)
WBC # BLD: 4.42 K/UL — SIGNIFICANT CHANGE UP (ref 3.8–10.5)
WBC # FLD AUTO: 4.42 K/UL — SIGNIFICANT CHANGE UP (ref 3.8–10.5)

## 2020-04-02 PROCEDURE — 99233 SBSQ HOSP IP/OBS HIGH 50: CPT

## 2020-04-02 RX ADMIN — BUDESONIDE AND FORMOTEROL FUMARATE DIHYDRATE 2 PUFF(S): 160; 4.5 AEROSOL RESPIRATORY (INHALATION) at 11:07

## 2020-04-02 RX ADMIN — BUDESONIDE AND FORMOTEROL FUMARATE DIHYDRATE 2 PUFF(S): 160; 4.5 AEROSOL RESPIRATORY (INHALATION) at 22:34

## 2020-04-02 RX ADMIN — Medication 200 MILLIGRAM(S): at 23:14

## 2020-04-02 RX ADMIN — Medication 1 PUFF(S): at 01:01

## 2020-04-02 RX ADMIN — ALBUTEROL 2 PUFF(S): 90 AEROSOL, METERED ORAL at 23:14

## 2020-04-02 RX ADMIN — Medication 200 MILLIGRAM(S): at 17:43

## 2020-04-02 RX ADMIN — ENOXAPARIN SODIUM 40 MILLIGRAM(S): 100 INJECTION SUBCUTANEOUS at 11:06

## 2020-04-02 RX ADMIN — Medication 100 MILLIGRAM(S): at 11:05

## 2020-04-02 RX ADMIN — Medication 5 MILLIGRAM(S): at 06:13

## 2020-04-02 RX ADMIN — ALBUTEROL 2 PUFF(S): 90 AEROSOL, METERED ORAL at 11:07

## 2020-04-02 RX ADMIN — Medication 1 PUFF(S): at 06:12

## 2020-04-02 RX ADMIN — Medication 1 PUFF(S): at 23:14

## 2020-04-02 RX ADMIN — ALBUTEROL 2 PUFF(S): 90 AEROSOL, METERED ORAL at 06:12

## 2020-04-02 RX ADMIN — ALBUTEROL 2 PUFF(S): 90 AEROSOL, METERED ORAL at 17:43

## 2020-04-02 RX ADMIN — Medication 400 MILLIGRAM(S): at 11:06

## 2020-04-02 RX ADMIN — Medication 650 MILLIGRAM(S): at 16:12

## 2020-04-02 RX ADMIN — Medication 1 PUFF(S): at 17:43

## 2020-04-02 RX ADMIN — ALBUTEROL 2 PUFF(S): 90 AEROSOL, METERED ORAL at 01:00

## 2020-04-02 RX ADMIN — Medication 1000 UNIT(S): at 11:06

## 2020-04-02 RX ADMIN — Medication 1 PUFF(S): at 11:06

## 2020-04-02 NOTE — PROGRESS NOTE ADULT - SUBJECTIVE AND OBJECTIVE BOX
Patient is a 54y old  Female who presents with a chief complaint of SOB, COVID-19 r/o (01 Apr 2020 13:58)      SUBJECTIVE / OVERNIGHT EVENTS: No acute events overnight. States she is feeling better and less SOB, cough still present. Was 88% on RA on ambulation    MEDICATIONS  (STANDING):  acetaminophen   Tablet .. 650 milliGRAM(s) Oral once  ALBUTerol    90 MICROgram(s) HFA Inhaler 2 Puff(s) Inhalation every 6 hours  budesonide  80 MICROgram(s)/formoterol 4.5 MICROgram(s) Inhaler 2 Puff(s) Inhalation two times a day  cholecalciferol 1000 Unit(s) Oral daily  enoxaparin Injectable 40 milliGRAM(s) SubCutaneous daily  guaiFENesin   Syrup  (Sugar-Free) 200 milliGRAM(s) Oral every 6 hours  hydroxychloroquine 400 milliGRAM(s) Oral daily  ipratropium 17 MICROgram(s) HFA Inhaler 1 Puff(s) Inhalation every 6 hours  methotrexate 10 milliGRAM(s) Oral <User Schedule>  predniSONE   Tablet 5 milliGRAM(s) Oral daily    MEDICATIONS  (PRN):    T(C): 36.3 (04-02-20 @ 11:14), Max: 36.9 (04-02-20 @ 10:04)  HR: 99 (04-02-20 @ 11:14) (82 - 99)  BP: 126/66 (04-02-20 @ 11:14) (94/47 - 126/66)  RR: 22 (04-02-20 @ 11:14) (20 - 23)  SpO2: 99% (04-02-20 @ 11:14) (88% - 99%)  CAPILLARY BLOOD GLUCOSE    I&O's Summary    01 Apr 2020 07:01  -  02 Apr 2020 07:00  --------------------------------------------------------  IN: 720 mL / OUT: 800 mL / NET: -80 mL    02 Apr 2020 07:01  -  02 Apr 2020 13:43  --------------------------------------------------------  IN: 240 mL / OUT: 0 mL / NET: 240 mL        PHYSICAL EXAM:  GENERAL: not in distress, on 2L NC  EYES: open, sclera clear b/l  CHEST/LUNG: normal respiratory effort, not tachypneic, speaking in full sentences without difficulty  HEART: no lower extremity edema b/l  ABDOMEN: Soft, Nontender, Nondistended  EXTREMITIES: Warm and well perfused  NEUROLOGY: awake, alert, responds to Qs appropriately, no gross deficits  PSYCH: calm, cooperative  SKIN: No visible rashes or lesions    LABS:                        10.7   4.42  )-----------( 264      ( 02 Apr 2020 05:45 )             32.9     04-02    138  |  102  |  7   ----------------------------<  79  4.1   |  26  |  0.86    Ca    9.3      02 Apr 2020 05:45  Phos  3.7     04-02  Mg     1.9     04-02    TPro  6.5  /  Alb  3.0<L>  /  TBili  0.6  /  DBili  x   /  AST  35<H>  /  ALT  38<H>  /  AlkPhos  71  04-02              RADIOLOGY & ADDITIONAL TESTS:

## 2020-04-02 NOTE — PROGRESS NOTE ADULT - PROBLEM SELECTOR PLAN 1
- COVID-19 positive, sepsis on admission: tachycardia, tachypnea, and leukocytosis   - Sepsis now resolved  - s/p Plaquenil COVID-19 dosing (3/25-3/30), now on home dose Plaquenil for Sarcoidosis   titrate off NC as tolerated, now on 2L NC, 88% on RA with ambulation  - s/p 5 days azithromycin 500 PO QD  - tessalon Perles, guaifenesin and cepacol for cough  - chest PT, incentive spirometry  - pulm recs appreciated  - encourage ambulation

## 2020-04-02 NOTE — PROGRESS NOTE ADULT - PROBLEM SELECTOR PLAN 4
Transitions of Care Status:  1.  Name of PCP: Dr. Kyle Conner, [phone],  2.  PCP Contacted on Admission: [x] Y    [ ] N   --- left  w/ staff  2.1 update given by previous hospitalist to pt's outpt Pulmonologist Dr. Page on 3/25  3.  PCP contacted at Discharge: [ ] Y    [ ] N    [ ] N/A  4.  Post-Discharge Appointment Date and Location:  5.  Summary of Handoff given to PCP:

## 2020-04-03 LAB
ALBUMIN SERPL ELPH-MCNC: 3.4 G/DL — SIGNIFICANT CHANGE UP (ref 3.3–5)
ALP SERPL-CCNC: 75 U/L — SIGNIFICANT CHANGE UP (ref 40–120)
ALT FLD-CCNC: 46 U/L — HIGH (ref 4–33)
ANION GAP SERPL CALC-SCNC: 11 MMO/L — SIGNIFICANT CHANGE UP (ref 7–14)
AST SERPL-CCNC: 39 U/L — HIGH (ref 4–32)
BASOPHILS # BLD AUTO: 0.05 K/UL — SIGNIFICANT CHANGE UP (ref 0–0.2)
BASOPHILS NFR BLD AUTO: 1.3 % — SIGNIFICANT CHANGE UP (ref 0–2)
BILIRUB SERPL-MCNC: 0.7 MG/DL — SIGNIFICANT CHANGE UP (ref 0.2–1.2)
BUN SERPL-MCNC: 8 MG/DL — SIGNIFICANT CHANGE UP (ref 7–23)
CALCIUM SERPL-MCNC: 9.1 MG/DL — SIGNIFICANT CHANGE UP (ref 8.4–10.5)
CHLORIDE SERPL-SCNC: 103 MMOL/L — SIGNIFICANT CHANGE UP (ref 98–107)
CO2 SERPL-SCNC: 26 MMOL/L — SIGNIFICANT CHANGE UP (ref 22–31)
CREAT SERPL-MCNC: 0.9 MG/DL — SIGNIFICANT CHANGE UP (ref 0.5–1.3)
CRP SERPL-MCNC: 9.9 MG/L — HIGH
EOSINOPHIL # BLD AUTO: 0.17 K/UL — SIGNIFICANT CHANGE UP (ref 0–0.5)
EOSINOPHIL NFR BLD AUTO: 4.5 % — SIGNIFICANT CHANGE UP (ref 0–6)
FERRITIN SERPL-MCNC: 212.2 NG/ML — HIGH (ref 15–150)
GLUCOSE SERPL-MCNC: 83 MG/DL — SIGNIFICANT CHANGE UP (ref 70–99)
HCT VFR BLD CALC: 33.4 % — LOW (ref 34.5–45)
HGB BLD-MCNC: 10.9 G/DL — LOW (ref 11.5–15.5)
IMM GRANULOCYTES NFR BLD AUTO: 2.4 % — HIGH (ref 0–1.5)
LDH SERPL L TO P-CCNC: 301 U/L — HIGH (ref 135–225)
LYMPHOCYTES # BLD AUTO: 0.44 K/UL — LOW (ref 1–3.3)
LYMPHOCYTES # BLD AUTO: 11.7 % — LOW (ref 13–44)
MAGNESIUM SERPL-MCNC: 1.8 MG/DL — SIGNIFICANT CHANGE UP (ref 1.6–2.6)
MCHC RBC-ENTMCNC: 31.1 PG — SIGNIFICANT CHANGE UP (ref 27–34)
MCHC RBC-ENTMCNC: 32.6 % — SIGNIFICANT CHANGE UP (ref 32–36)
MCV RBC AUTO: 95.2 FL — SIGNIFICANT CHANGE UP (ref 80–100)
MONOCYTES # BLD AUTO: 0.26 K/UL — SIGNIFICANT CHANGE UP (ref 0–0.9)
MONOCYTES NFR BLD AUTO: 6.9 % — SIGNIFICANT CHANGE UP (ref 2–14)
NEUTROPHILS # BLD AUTO: 2.74 K/UL — SIGNIFICANT CHANGE UP (ref 1.8–7.4)
NEUTROPHILS NFR BLD AUTO: 73.2 % — SIGNIFICANT CHANGE UP (ref 43–77)
NRBC # FLD: 0 K/UL — SIGNIFICANT CHANGE UP (ref 0–0)
PHOSPHATE SERPL-MCNC: 3.4 MG/DL — SIGNIFICANT CHANGE UP (ref 2.5–4.5)
PLATELET # BLD AUTO: 276 K/UL — SIGNIFICANT CHANGE UP (ref 150–400)
PMV BLD: 10.8 FL — SIGNIFICANT CHANGE UP (ref 7–13)
POTASSIUM SERPL-MCNC: 3.9 MMOL/L — SIGNIFICANT CHANGE UP (ref 3.5–5.3)
POTASSIUM SERPL-SCNC: 3.9 MMOL/L — SIGNIFICANT CHANGE UP (ref 3.5–5.3)
PROT SERPL-MCNC: 6.2 G/DL — SIGNIFICANT CHANGE UP (ref 6–8.3)
RBC # BLD: 3.51 M/UL — LOW (ref 3.8–5.2)
RBC # FLD: 13.1 % — SIGNIFICANT CHANGE UP (ref 10.3–14.5)
SODIUM SERPL-SCNC: 140 MMOL/L — SIGNIFICANT CHANGE UP (ref 135–145)
WBC # BLD: 3.75 K/UL — LOW (ref 3.8–10.5)
WBC # FLD AUTO: 3.75 K/UL — LOW (ref 3.8–10.5)

## 2020-04-03 RX ADMIN — Medication 200 MILLIGRAM(S): at 12:06

## 2020-04-03 RX ADMIN — ALBUTEROL 2 PUFF(S): 90 AEROSOL, METERED ORAL at 05:33

## 2020-04-03 RX ADMIN — Medication 1 PUFF(S): at 05:32

## 2020-04-03 RX ADMIN — Medication 5 MILLIGRAM(S): at 05:33

## 2020-04-03 RX ADMIN — BUDESONIDE AND FORMOTEROL FUMARATE DIHYDRATE 2 PUFF(S): 160; 4.5 AEROSOL RESPIRATORY (INHALATION) at 12:05

## 2020-04-03 RX ADMIN — Medication 1000 UNIT(S): at 12:05

## 2020-04-03 RX ADMIN — ENOXAPARIN SODIUM 40 MILLIGRAM(S): 100 INJECTION SUBCUTANEOUS at 12:04

## 2020-04-03 RX ADMIN — ALBUTEROL 2 PUFF(S): 90 AEROSOL, METERED ORAL at 18:00

## 2020-04-03 RX ADMIN — Medication 1 PUFF(S): at 23:32

## 2020-04-03 RX ADMIN — Medication 1 PUFF(S): at 12:06

## 2020-04-03 RX ADMIN — Medication 400 MILLIGRAM(S): at 12:04

## 2020-04-03 RX ADMIN — BUDESONIDE AND FORMOTEROL FUMARATE DIHYDRATE 2 PUFF(S): 160; 4.5 AEROSOL RESPIRATORY (INHALATION) at 20:27

## 2020-04-03 RX ADMIN — ALBUTEROL 2 PUFF(S): 90 AEROSOL, METERED ORAL at 12:05

## 2020-04-03 RX ADMIN — Medication 200 MILLIGRAM(S): at 05:33

## 2020-04-03 RX ADMIN — ALBUTEROL 2 PUFF(S): 90 AEROSOL, METERED ORAL at 23:32

## 2020-04-03 RX ADMIN — Medication 1 PUFF(S): at 18:00

## 2020-04-03 RX ADMIN — Medication 200 MILLIGRAM(S): at 18:00

## 2020-04-03 NOTE — PROGRESS NOTE ADULT - PROBLEM SELECTOR PLAN 2
dx in 2017 via lung bx on maintenance mtx and hydroxychloroquine; prednisone reduced recently to 5mg PO QD  - per Pulm, can c/w MTX weekly  - on home dose of Plaquenil 400mg po q daily  - c/w home Prednisone  - as per o/p PULM Dr. Page  to obtain additional pt hx.  Pt has mild obstructive dx on mtx qweekly and plaquenil.  Per Dr. Page, TTE in July 2019 did not show pHTN; his plan was for repeat echo this year.

## 2020-04-03 NOTE — PROGRESS NOTE ADULT - SUBJECTIVE AND OBJECTIVE BOX
Patient is a 54y old  Female who presents with a chief complaint of SOB, COVID-19 r/o (03 Apr 2020 15:43)      SUBJECTIVE / OVERNIGHT EVENTS: No acute events overnight.    MEDICATIONS  (STANDING):  ALBUTerol    90 MICROgram(s) HFA Inhaler 2 Puff(s) Inhalation every 6 hours  budesonide  80 MICROgram(s)/formoterol 4.5 MICROgram(s) Inhaler 2 Puff(s) Inhalation two times a day  cholecalciferol 1000 Unit(s) Oral daily  enoxaparin Injectable 40 milliGRAM(s) SubCutaneous daily  guaiFENesin   Syrup  (Sugar-Free) 200 milliGRAM(s) Oral every 6 hours  hydroxychloroquine 400 milliGRAM(s) Oral daily  ipratropium 17 MICROgram(s) HFA Inhaler 1 Puff(s) Inhalation every 6 hours  methotrexate 10 milliGRAM(s) Oral <User Schedule>  predniSONE   Tablet 5 milliGRAM(s) Oral daily    MEDICATIONS  (PRN):      T(C): 36.7 (04-03-20 @ 10:49), Max: 36.7 (04-03-20 @ 10:49)  HR: 82 (04-03-20 @ 10:49) (80 - 82)  BP: 109/72 (04-03-20 @ 10:49) (109/72 - 121/69)  RR: 18 (04-03-20 @ 10:49) (18 - 23)  SpO2: 96% (04-03-20 @ 10:49) (89% - 96%)  CAPILLARY BLOOD GLUCOSE        I&O's Summary    02 Apr 2020 07:01  -  03 Apr 2020 07:00  --------------------------------------------------------  IN: 1070 mL / OUT: 0 mL / NET: 1070 mL        PHYSICAL EXAM:  GENERAL: not in distress, on nasal canula   EYES: open, sclera clear b/l  CHEST/LUNG: normal respiratory effort, not tachypneic, speaking in full sentences without difficulty  HEART: Not tachycardic, no lower extremity edema b/l  ABDOMEN: Soft, Nontender, Nondistended  EXTREMITIES: Warm and well perfused  NEUROLOGY: awake, alert, responds to Qs appropriately, no gross deficits  PSYCH: calm, cooperative  SKIN: No visible rashes or lesions    LABS:                        10.9   3.75  )-----------( 276      ( 03 Apr 2020 06:30 )             33.4     04-03    140  |  103  |  8   ----------------------------<  83  3.9   |  26  |  0.90    Ca    9.1      03 Apr 2020 06:30  Phos  3.4     04-03  Mg     1.8     04-03    TPro  6.2  /  Alb  3.4  /  TBili  0.7  /  DBili  x   /  AST  39<H>  /  ALT  46<H>  /  AlkPhos  75  04-03              RADIOLOGY & ADDITIONAL TESTS:

## 2020-04-03 NOTE — PROGRESS NOTE ADULT - PROBLEM SELECTOR PLAN 4
Transitions of Care Status:  1.  Name of PCP: Dr. Kyle Conner, [phone],  2.  PCP Contacted on Admission: [x] Y    [ ] N   --- left  w/ staff  2.1 update given by previous hospitalist to pt's outpt Pulmonologist Dr. Page on 3/25  3.  PCP contacted at Discharge: [ ] Y    [ ] N    [ ] N/A  4.  Post-Discharge Appointment Date and Location:  5.  Summary of Handoff given to PCP:

## 2020-04-04 LAB
ALBUMIN SERPL ELPH-MCNC: 3.3 G/DL — SIGNIFICANT CHANGE UP (ref 3.3–5)
ALP SERPL-CCNC: 71 U/L — SIGNIFICANT CHANGE UP (ref 40–120)
ALT FLD-CCNC: 46 U/L — HIGH (ref 4–33)
ANION GAP SERPL CALC-SCNC: 12 MMO/L — SIGNIFICANT CHANGE UP (ref 7–14)
APPEARANCE UR: CLEAR — SIGNIFICANT CHANGE UP
AST SERPL-CCNC: 43 U/L — HIGH (ref 4–32)
BACTERIA # UR AUTO: NEGATIVE — SIGNIFICANT CHANGE UP
BASOPHILS # BLD AUTO: 0.04 K/UL — SIGNIFICANT CHANGE UP (ref 0–0.2)
BASOPHILS NFR BLD AUTO: 1.1 % — SIGNIFICANT CHANGE UP (ref 0–2)
BILIRUB SERPL-MCNC: 0.6 MG/DL — SIGNIFICANT CHANGE UP (ref 0.2–1.2)
BILIRUB UR-MCNC: NEGATIVE — SIGNIFICANT CHANGE UP
BLOOD UR QL VISUAL: SIGNIFICANT CHANGE UP
BUN SERPL-MCNC: 6 MG/DL — LOW (ref 7–23)
CALCIUM SERPL-MCNC: 9.5 MG/DL — SIGNIFICANT CHANGE UP (ref 8.4–10.5)
CHLORIDE SERPL-SCNC: 99 MMOL/L — SIGNIFICANT CHANGE UP (ref 98–107)
CO2 SERPL-SCNC: 26 MMOL/L — SIGNIFICANT CHANGE UP (ref 22–31)
COLOR SPEC: SIGNIFICANT CHANGE UP
CREAT SERPL-MCNC: 0.83 MG/DL — SIGNIFICANT CHANGE UP (ref 0.5–1.3)
EOSINOPHIL # BLD AUTO: 0.16 K/UL — SIGNIFICANT CHANGE UP (ref 0–0.5)
EOSINOPHIL NFR BLD AUTO: 4.2 % — SIGNIFICANT CHANGE UP (ref 0–6)
GLUCOSE SERPL-MCNC: 78 MG/DL — SIGNIFICANT CHANGE UP (ref 70–99)
GLUCOSE UR-MCNC: NEGATIVE — SIGNIFICANT CHANGE UP
HCT VFR BLD CALC: 32.8 % — LOW (ref 34.5–45)
HGB BLD-MCNC: 10.7 G/DL — LOW (ref 11.5–15.5)
HYALINE CASTS # UR AUTO: NEGATIVE — SIGNIFICANT CHANGE UP
IMM GRANULOCYTES NFR BLD AUTO: 1.6 % — HIGH (ref 0–1.5)
KETONES UR-MCNC: NEGATIVE — SIGNIFICANT CHANGE UP
LEUKOCYTE ESTERASE UR-ACNC: SIGNIFICANT CHANGE UP
LYMPHOCYTES # BLD AUTO: 0.4 K/UL — LOW (ref 1–3.3)
LYMPHOCYTES # BLD AUTO: 10.6 % — LOW (ref 13–44)
MAGNESIUM SERPL-MCNC: 1.9 MG/DL — SIGNIFICANT CHANGE UP (ref 1.6–2.6)
MCHC RBC-ENTMCNC: 30.8 PG — SIGNIFICANT CHANGE UP (ref 27–34)
MCHC RBC-ENTMCNC: 32.6 % — SIGNIFICANT CHANGE UP (ref 32–36)
MCV RBC AUTO: 94.5 FL — SIGNIFICANT CHANGE UP (ref 80–100)
MONOCYTES # BLD AUTO: 0.32 K/UL — SIGNIFICANT CHANGE UP (ref 0–0.9)
MONOCYTES NFR BLD AUTO: 8.4 % — SIGNIFICANT CHANGE UP (ref 2–14)
NEUTROPHILS # BLD AUTO: 2.81 K/UL — SIGNIFICANT CHANGE UP (ref 1.8–7.4)
NEUTROPHILS NFR BLD AUTO: 74.1 % — SIGNIFICANT CHANGE UP (ref 43–77)
NITRITE UR-MCNC: NEGATIVE — SIGNIFICANT CHANGE UP
NRBC # FLD: 0 K/UL — SIGNIFICANT CHANGE UP (ref 0–0)
PH UR: 6.5 — SIGNIFICANT CHANGE UP (ref 5–8)
PHOSPHATE SERPL-MCNC: 3.6 MG/DL — SIGNIFICANT CHANGE UP (ref 2.5–4.5)
PLATELET # BLD AUTO: 261 K/UL — SIGNIFICANT CHANGE UP (ref 150–400)
PMV BLD: 10.9 FL — SIGNIFICANT CHANGE UP (ref 7–13)
POTASSIUM SERPL-MCNC: 3.7 MMOL/L — SIGNIFICANT CHANGE UP (ref 3.5–5.3)
POTASSIUM SERPL-SCNC: 3.7 MMOL/L — SIGNIFICANT CHANGE UP (ref 3.5–5.3)
PROT SERPL-MCNC: 6.8 G/DL — SIGNIFICANT CHANGE UP (ref 6–8.3)
PROT UR-MCNC: NEGATIVE — SIGNIFICANT CHANGE UP
RBC # BLD: 3.47 M/UL — LOW (ref 3.8–5.2)
RBC # FLD: 13.4 % — SIGNIFICANT CHANGE UP (ref 10.3–14.5)
RBC CASTS # UR COMP ASSIST: SIGNIFICANT CHANGE UP (ref 0–?)
SODIUM SERPL-SCNC: 137 MMOL/L — SIGNIFICANT CHANGE UP (ref 135–145)
SP GR SPEC: 1.01 — SIGNIFICANT CHANGE UP (ref 1–1.04)
SQUAMOUS # UR AUTO: SIGNIFICANT CHANGE UP
UROBILINOGEN FLD QL: NORMAL — SIGNIFICANT CHANGE UP
WBC # BLD: 3.79 K/UL — LOW (ref 3.8–10.5)
WBC # FLD AUTO: 3.79 K/UL — LOW (ref 3.8–10.5)
WBC UR QL: SIGNIFICANT CHANGE UP (ref 0–?)

## 2020-04-04 PROCEDURE — 99233 SBSQ HOSP IP/OBS HIGH 50: CPT

## 2020-04-04 RX ADMIN — Medication 1000 UNIT(S): at 10:46

## 2020-04-04 RX ADMIN — Medication 200 MILLIGRAM(S): at 12:05

## 2020-04-04 RX ADMIN — ALBUTEROL 2 PUFF(S): 90 AEROSOL, METERED ORAL at 16:22

## 2020-04-04 RX ADMIN — Medication 1 PUFF(S): at 05:45

## 2020-04-04 RX ADMIN — BUDESONIDE AND FORMOTEROL FUMARATE DIHYDRATE 2 PUFF(S): 160; 4.5 AEROSOL RESPIRATORY (INHALATION) at 08:25

## 2020-04-04 RX ADMIN — ENOXAPARIN SODIUM 40 MILLIGRAM(S): 100 INJECTION SUBCUTANEOUS at 10:46

## 2020-04-04 RX ADMIN — ALBUTEROL 2 PUFF(S): 90 AEROSOL, METERED ORAL at 10:35

## 2020-04-04 RX ADMIN — Medication 5 MILLIGRAM(S): at 05:45

## 2020-04-04 RX ADMIN — Medication 200 MILLIGRAM(S): at 00:47

## 2020-04-04 RX ADMIN — Medication 200 MILLIGRAM(S): at 16:22

## 2020-04-04 RX ADMIN — Medication 200 MILLIGRAM(S): at 05:44

## 2020-04-04 RX ADMIN — Medication 1 PUFF(S): at 10:46

## 2020-04-04 RX ADMIN — BUDESONIDE AND FORMOTEROL FUMARATE DIHYDRATE 2 PUFF(S): 160; 4.5 AEROSOL RESPIRATORY (INHALATION) at 22:13

## 2020-04-04 RX ADMIN — ALBUTEROL 2 PUFF(S): 90 AEROSOL, METERED ORAL at 05:45

## 2020-04-04 RX ADMIN — Medication 400 MILLIGRAM(S): at 10:46

## 2020-04-04 RX ADMIN — Medication 1 PUFF(S): at 16:22

## 2020-04-04 NOTE — PROGRESS NOTE ADULT - PROBLEM SELECTOR PLAN 1
- COVID-19 positive  - s/p Plaquenil COVID-19 dosing (3/25-3/30), now on home dose Plaquenil and home dose prednisone for Sarcoidosis and s/p 5 days azithro  titrate off NC as tolerated, does well on RA if not coughing.   C/w guaifenesin ATC, hycodan qhs  - chest PT, incentive spirometry - COVID-19 positive  - s/p Plaquenil COVID-19 dosing (3/25-3/30), now on home dose Plaquenil and home dose prednisone for Sarcoidosis and s/p 5 days azithro  titrate off NC as tolerated, does well on RA if not coughing.   C/w guaifenesin ATC, hycodan qhs, chest PT, incentive spirometry  albuterol and Atrovent HFA ATC, symbicort BID

## 2020-04-04 NOTE — PROGRESS NOTE ADULT - SUBJECTIVE AND OBJECTIVE BOX
Patient is a 54y old  Female who presents with a chief complaint of SOB, COVID-19 r/o (2020 15:43)      SUBJECTIVE / OVERNIGHT EVENTS: No acute events overnight. Feels better but still with excessive coughing and difficulty using spirometer.    MEDICATIONS  (STANDING):  ALBUTerol    90 MICROgram(s) HFA Inhaler 2 Puff(s) Inhalation every 6 hours  budesonide  80 MICROgram(s)/formoterol 4.5 MICROgram(s) Inhaler 2 Puff(s) Inhalation two times a day  cholecalciferol 1000 Unit(s) Oral daily  enoxaparin Injectable 40 milliGRAM(s) SubCutaneous daily  guaiFENesin   Syrup  (Sugar-Free) 200 milliGRAM(s) Oral every 6 hours  hydrocodone/homatropine Syrup 3 milliLiter(s) Oral at bedtime  hydroxychloroquine 400 milliGRAM(s) Oral daily  ipratropium 17 MICROgram(s) HFA Inhaler 1 Puff(s) Inhalation every 6 hours  methotrexate 10 milliGRAM(s) Oral <User Schedule>  predniSONE   Tablet 5 milliGRAM(s) Oral daily    MEDICATIONS  (PRN):      T(C): 36.8 (20 @ 10:30), Max: 36.8 (20 @ 10:30)  HR: 118 (20 @ 10:30) (92 - 118)  BP: 100/67 (20 @ 10:30) (100/67 - 128/59)  RR: 20 (20 @ 10:30) (18 - 20)  SpO2: 95% (20 @ 10:30) (95% - 99%)  CAPILLARY BLOOD GLUCOSE        I&O's Summary    2020 07:01  -  2020 07:00  --------------------------------------------------------  IN: 720 mL / OUT: 0 mL / NET: 720 mL        PHYSICAL EXAM:  GENERAL: seen ambulating from bathroom on RA in no distress, developed a coughing fit while attempting to lie prone and using spirometer desatted to 82% on RA, improved to 97% on 3 L NC  EYES: open, sclera clear b/l  CHEST/LUNG: tachypneic and coughing excessively with audible wheezing  HEART: no lower extremity edema b/l  ABDOMEN: Soft, Nontender, Nondistended  EXTREMITIES: Warm and well perfused  NEUROLOGY: awake, alert, responds to Qs appropriately, no gross deficits  SKIN: No visible rashes or lesions    LABS:                        10.7   3.79  )-----------( 261      ( 2020 07:30 )             32.8     04-04    137  |  99  |  6<L>  ----------------------------<  78  3.7   |  26  |  0.83    Ca    9.5      2020 07:30  Phos  3.6     04-04  Mg     1.9     04-04    TPro  6.8  /  Alb  3.3  /  TBili  0.6  /  DBili  x   /  AST  43<H>  /  ALT  46<H>  /  AlkPhos  71  04-04          Urinalysis Basic - ( 2020 01:00 )    Color: LIGHT YELLOW / Appearance: CLEAR / S.007 / pH: 6.5  Gluc: NEGATIVE / Ketone: NEGATIVE  / Bili: NEGATIVE / Urobili: NORMAL   Blood: SMALL / Protein: NEGATIVE / Nitrite: NEGATIVE   Leuk Esterase: TRACE / RBC: 0-2 / WBC 0-2   Sq Epi: OCC / Non Sq Epi: x / Bacteria: NEGATIVE        RADIOLOGY & ADDITIONAL TESTS:

## 2020-04-04 NOTE — PROGRESS NOTE ADULT - PROBLEM SELECTOR PLAN 4
Transitions of Care Status:  1.  Name of PCP: Dr. Kyle Conner, [phone],  2.  PCP Contacted on Admission: [x] Y    [ ] N   --- left  w/ staff  2.1 update given by previous hospitalist to pt's outpt Pulmonologist Dr. Page on 3/25  3.  PCP contacted at Discharge: [ ] Y    [ ] N    [ ] N/A  4.  Post-Discharge Appointment Date and Location:  5.  Summary of Handoff given to PCP:

## 2020-04-05 PROCEDURE — 99233 SBSQ HOSP IP/OBS HIGH 50: CPT

## 2020-04-05 RX ORDER — ACETAMINOPHEN 500 MG
2 TABLET ORAL
Qty: 112 | Refills: 0
Start: 2020-04-05 | End: 2020-04-18

## 2020-04-05 RX ORDER — POTASSIUM CHLORIDE 20 MEQ
40 PACKET (EA) ORAL ONCE
Refills: 0 | Status: COMPLETED | OUTPATIENT
Start: 2020-04-05 | End: 2020-04-05

## 2020-04-05 RX ADMIN — Medication 40 MILLIEQUIVALENT(S): at 11:16

## 2020-04-05 RX ADMIN — ALBUTEROL 2 PUFF(S): 90 AEROSOL, METERED ORAL at 05:12

## 2020-04-05 RX ADMIN — Medication 1 PUFF(S): at 05:13

## 2020-04-05 RX ADMIN — Medication 200 MILLIGRAM(S): at 23:08

## 2020-04-05 RX ADMIN — Medication 1 PUFF(S): at 18:24

## 2020-04-05 RX ADMIN — ALBUTEROL 2 PUFF(S): 90 AEROSOL, METERED ORAL at 23:08

## 2020-04-05 RX ADMIN — ALBUTEROL 2 PUFF(S): 90 AEROSOL, METERED ORAL at 00:11

## 2020-04-05 RX ADMIN — Medication 400 MILLIGRAM(S): at 11:17

## 2020-04-05 RX ADMIN — ALBUTEROL 2 PUFF(S): 90 AEROSOL, METERED ORAL at 11:17

## 2020-04-05 RX ADMIN — Medication 1 PUFF(S): at 23:09

## 2020-04-05 RX ADMIN — BUDESONIDE AND FORMOTEROL FUMARATE DIHYDRATE 2 PUFF(S): 160; 4.5 AEROSOL RESPIRATORY (INHALATION) at 21:42

## 2020-04-05 RX ADMIN — Medication 1 PUFF(S): at 11:18

## 2020-04-05 RX ADMIN — BUDESONIDE AND FORMOTEROL FUMARATE DIHYDRATE 2 PUFF(S): 160; 4.5 AEROSOL RESPIRATORY (INHALATION) at 09:10

## 2020-04-05 RX ADMIN — Medication 1000 UNIT(S): at 11:18

## 2020-04-05 RX ADMIN — ALBUTEROL 2 PUFF(S): 90 AEROSOL, METERED ORAL at 18:24

## 2020-04-05 RX ADMIN — Medication 200 MILLIGRAM(S): at 11:17

## 2020-04-05 RX ADMIN — Medication 1 PUFF(S): at 00:12

## 2020-04-05 RX ADMIN — ENOXAPARIN SODIUM 40 MILLIGRAM(S): 100 INJECTION SUBCUTANEOUS at 11:17

## 2020-04-05 RX ADMIN — Medication 200 MILLIGRAM(S): at 05:12

## 2020-04-05 RX ADMIN — Medication 200 MILLIGRAM(S): at 18:23

## 2020-04-05 RX ADMIN — Medication 200 MILLIGRAM(S): at 00:09

## 2020-04-05 RX ADMIN — Medication 5 MILLIGRAM(S): at 05:12

## 2020-04-05 NOTE — CHART NOTE - NSCHARTNOTEFT_GEN_A_CORE
Received call from KARINA Chan stating ambulatory O2 sat = 91% and patient tachypneic.  Contacted Dr. Enriquez to advise of same.  Per Dr. Enriquez, ok to discharge patient.  She is advised to rest and follow up with her primary care and pulmonologist outpatient.

## 2020-04-05 NOTE — PROGRESS NOTE ADULT - PROBLEM SELECTOR PLAN 4
Transitions of Care Status:  1.  Name of PCP: Dr. Kyle Conner, [phone],  2.  PCP Contacted on Admission: [x] Y    [ ] N   --- left  w/ staff  2.1 update given by previous hospitalist to pt's outpt Pulmonologist Dr. Page on 3/25  3.  PCP contacted at Discharge: [ ] Y    [ ] N    [ ] N/A  4.  Post-Discharge Appointment Date and Location:  5.  Summary of Handoff given to PCP:

## 2020-04-05 NOTE — PROVIDER CONTACT NOTE (OTHER) - ASSESSMENT
Pt A&Ox4. 95% on room air at rest, 91% while ambulating, pt expressed that she does not feel safe going home and does not want to go home

## 2020-04-05 NOTE — PROGRESS NOTE ADULT - SUBJECTIVE AND OBJECTIVE BOX
Patient is a 54y old  Female who presents with a chief complaint of SOB, COVID-19 r/o (2020 13:39)      SUBJECTIVE / OVERNIGHT EVENTS: No acute events overnight. Feels better today. Denies pain, SOB and cough is improving.    MEDICATIONS  (STANDING):  ALBUTerol    90 MICROgram(s) HFA Inhaler 2 Puff(s) Inhalation every 6 hours  budesonide  80 MICROgram(s)/formoterol 4.5 MICROgram(s) Inhaler 2 Puff(s) Inhalation two times a day  cholecalciferol 1000 Unit(s) Oral daily  enoxaparin Injectable 40 milliGRAM(s) SubCutaneous daily  guaiFENesin   Syrup  (Sugar-Free) 200 milliGRAM(s) Oral every 6 hours  hydrocodone/homatropine Syrup 3 milliLiter(s) Oral at bedtime  hydroxychloroquine 400 milliGRAM(s) Oral daily  ipratropium 17 MICROgram(s) HFA Inhaler 1 Puff(s) Inhalation every 6 hours  methotrexate 10 milliGRAM(s) Oral <User Schedule>  predniSONE   Tablet 5 milliGRAM(s) Oral daily    MEDICATIONS  (PRN):      T(C): 37 (20 @ 09:30), Max: 37 (20 @ 09:30)  HR: 89 (20 @ 09:30) (88 - 89)  BP: 112/64 (20 @ 09:30) (110/60 - 112/64)  RR: 20 (20 @ 09:30) (20 - 20)  SpO2: 96% (20 @ 09:35) (95% - 100%)  CAPILLARY BLOOD GLUCOSE        I&O's Summary    2020 07:  -  2020 07:00  --------------------------------------------------------  IN: 120 mL / OUT: 0 mL / NET: 120 mL    2020 07:  -  2020 13:57  --------------------------------------------------------  IN: 200 mL / OUT: 0 mL / NET: 200 mL        PHYSICAL EXAM:  GENERAL: in no distress, on room air (satting >90% on room air at rest and with ambulation)  EYES: open, sclera clear b/l  CHEST/LUNG: normal respiratory effort  HEART: no lower extremity edema b/l  ABDOMEN: Soft, Nontender, Nondistended  EXTREMITIES: Warm and well perfused  NEUROLOGY: awake, alert, responds to Qs appropriately, no gross deficits  SKIN: No visible rashes or lesions    LABS:                        10.7   3.79  )-----------( 261      ( 2020 07:30 )             32.8     04-04    137  |  99  |  6<L>  ----------------------------<  78  3.7   |  26  |  0.83    Ca    9.5      2020 07:30  Phos  3.6     04-04  Mg     1.9     04-04    TPro  6.8  /  Alb  3.3  /  TBili  0.6  /  DBili  x   /  AST  43<H>  /  ALT  46<H>  /  AlkPhos  71  04-04          Urinalysis Basic - ( 2020 01:00 )    Color: LIGHT YELLOW / Appearance: CLEAR / S.007 / pH: 6.5  Gluc: NEGATIVE / Ketone: NEGATIVE  / Bili: NEGATIVE / Urobili: NORMAL   Blood: SMALL / Protein: NEGATIVE / Nitrite: NEGATIVE   Leuk Esterase: TRACE / RBC: 0-2 / WBC 0-2   Sq Epi: OCC / Non Sq Epi: x / Bacteria: NEGATIVE        RADIOLOGY & ADDITIONAL TESTS:

## 2020-04-05 NOTE — PROGRESS NOTE ADULT - PROBLEM SELECTOR PLAN 1
- COVID-19 positive c/b hypoxic respiratory failure, now off NC and on RA  - s/p Plaquenil COVID-19 dosing (3/25-3/30), now on home dose Plaquenil and home dose prednisone for Sarcoidosis and s/p 5 days azithro  C/w guaifenesin ATC, hycodan qhs, chest PT, incentive spirometry  albuterol and Atrovent HFA ATC, symbicort BID

## 2020-04-06 VITALS
SYSTOLIC BLOOD PRESSURE: 127 MMHG | OXYGEN SATURATION: 94 % | TEMPERATURE: 98 F | HEART RATE: 99 BPM | RESPIRATION RATE: 18 BRPM | DIASTOLIC BLOOD PRESSURE: 56 MMHG

## 2020-04-06 PROCEDURE — 99239 HOSP IP/OBS DSCHRG MGMT >30: CPT

## 2020-04-06 RX ORDER — ACETAMINOPHEN 500 MG
2 TABLET ORAL
Qty: 30 | Refills: 0
Start: 2020-04-06

## 2020-04-06 RX ORDER — HYDROXYCHLOROQUINE SULFATE 200 MG
2 TABLET ORAL
Qty: 30 | Refills: 0
Start: 2020-04-06

## 2020-04-06 RX ORDER — HYDROXYCHLOROQUINE SULFATE 200 MG
400 TABLET ORAL
Qty: 0 | Refills: 0 | DISCHARGE

## 2020-04-06 RX ADMIN — Medication 200 MILLIGRAM(S): at 05:18

## 2020-04-06 RX ADMIN — ALBUTEROL 2 PUFF(S): 90 AEROSOL, METERED ORAL at 12:17

## 2020-04-06 RX ADMIN — ENOXAPARIN SODIUM 40 MILLIGRAM(S): 100 INJECTION SUBCUTANEOUS at 12:17

## 2020-04-06 RX ADMIN — Medication 1 PUFF(S): at 05:18

## 2020-04-06 RX ADMIN — BUDESONIDE AND FORMOTEROL FUMARATE DIHYDRATE 2 PUFF(S): 160; 4.5 AEROSOL RESPIRATORY (INHALATION) at 12:16

## 2020-04-06 RX ADMIN — Medication 5 MILLIGRAM(S): at 05:18

## 2020-04-06 RX ADMIN — ALBUTEROL 2 PUFF(S): 90 AEROSOL, METERED ORAL at 05:18

## 2020-04-06 RX ADMIN — Medication 1000 UNIT(S): at 12:17

## 2020-04-06 RX ADMIN — Medication 400 MILLIGRAM(S): at 12:18

## 2020-04-06 RX ADMIN — Medication 1 PUFF(S): at 12:18

## 2020-04-06 NOTE — PROGRESS NOTE ADULT - PROBLEM SELECTOR PROBLEM 1
Infection due to 2019 novel coronavirus
SIRS (systemic inflammatory response syndrome)
Infection due to 2019 novel coronavirus
Infection due to 2019 novel coronavirus

## 2020-04-06 NOTE — PROGRESS NOTE ADULT - PROBLEM SELECTOR PROBLEM 4
Prophylactic measure
Prophylactic measure
Discharge planning issues
Prophylactic measure
Sarcoidosis
Sarcoidosis
Discharge planning issues
Prophylactic measure

## 2020-04-06 NOTE — PROGRESS NOTE ADULT - ATTENDING COMMENTS
[id number]
[id number]
DC planning once off O2 however if remains stable on O2, can consider DC home with home O2    Diagnoses: 1. acute hypoxic respiratory failure 2. COVID 19 infection  Level 3: 34057
[id number]
DC planning once off O2
continue with plaq/zithro - check EKG today.  encourage out of bed.  wean oxygen as tolerated. Pt. currently on 4L oxygen.
continue with current regimen.  monitor oxygen requirements.
continue with plaq/zithro - check EKG today.  encourage out of bed.  wean oxygen as tolerated. Pt. currently on 4L oxygen.
pt is covid +, would give higher dose of plaquenil for now, continue with home dose of steroids.  will follow up with pulm - will discuss restarting mtx, pt has missed '2 weeks'.  monitor respiratory status closely.
continue with plaq/zithro - check EKG today.  encourage out of bed.  wean oxygen as tolerated.  pt does get a bit anxious when her o2 is decreased.

## 2020-04-06 NOTE — DISCHARGE NOTE NURSING/CASE MANAGEMENT/SOCIAL WORK - NSDCFUADDAPPT_GEN_ALL_CORE_FT
Please follow up with your primary care physician, Dr. Kyle Conner, and your pulmonologist, Dr. Ceron for follow up labs and clinical after 2 week isolation is finished

## 2020-04-06 NOTE — PROGRESS NOTE ADULT - PROBLEM SELECTOR PROBLEM 2
SIRS (systemic inflammatory response syndrome)
SIRS (systemic inflammatory response syndrome)
Infection due to 2019 novel coronavirus
SIRS (systemic inflammatory response syndrome)
Sarcoidosis
SIRS (systemic inflammatory response syndrome)

## 2020-04-06 NOTE — PROGRESS NOTE ADULT - PROBLEM SELECTOR PROBLEM 3
Sarcoidosis
Sarcoidosis
Prophylactic measure
R/O Hypokalemia
R/O Hypokalemia
Sarcoidosis
Prophylactic measure
Sarcoidosis

## 2020-04-06 NOTE — DISCHARGE NOTE NURSING/CASE MANAGEMENT/SOCIAL WORK - PATIENT PORTAL LINK FT
You can access the FollowMyHealth Patient Portal offered by [facility] by registering at the following website: [email or web address]. By joining [facility]’s FollowMyHealth portal, you will also be able to view your health information using other applications (apps) compatible with our system.

## 2020-04-06 NOTE — PROGRESS NOTE ADULT - SUBJECTIVE AND OBJECTIVE BOX
INCOMPLETE Patient is a 54y old  Female who presents with a chief complaint of SOB, COVID-19 r/o (06 Apr 2020 09:25)      SUBJECTIVE / OVERNIGHT EVENTS: No acute events overnight. Cough and SOB improved. Requesting meds be sent to [facility]    MEDICATIONS  (STANDING):  ALBUTerol    90 MICROgram(s) HFA Inhaler 2 Puff(s) Inhalation every 6 hours  budesonide  80 MICROgram(s)/formoterol 4.5 MICROgram(s) Inhaler 2 Puff(s) Inhalation two times a day  cholecalciferol 1000 Unit(s) Oral daily  enoxaparin Injectable 40 milliGRAM(s) SubCutaneous daily  hydroxychloroquine 400 milliGRAM(s) Oral daily  ipratropium 17 MICROgram(s) HFA Inhaler 1 Puff(s) Inhalation every 6 hours  methotrexate 10 milliGRAM(s) Oral <User Schedule>  predniSONE   Tablet 5 milliGRAM(s) Oral daily    MEDICATIONS  (PRN):      T(C): 36.4 (04-06-20 @ 10:26), Max: 36.7 (04-05-20 @ 21:41)  HR: 99 (04-06-20 @ 10:26) (90 - 99)  BP: 127/56 (04-06-20 @ 10:26) (127/56 - 138/50)  RR: 18 (04-06-20 @ 10:26) (18 - 20)  SpO2: 94% (04-06-20 @ 10:26) (91% - 97%)  CAPILLARY BLOOD GLUCOSE        I&O's Summary    05 Apr 2020 07:01  -  06 Apr 2020 07:00  --------------------------------------------------------  IN: 400 mL / OUT: 0 mL / NET: 400 mL        PHYSICAL EXAM:  GENERAL: in no distress, on room air   EYES: open, sclera clear b/l  CHEST/LUNG: normal respiratory effort  HEART: no lower extremity edema b/l  ABDOMEN: Soft, Nontender, Nondistended  EXTREMITIES: Warm and well perfused  NEUROLOGY: awake, alert, responds to Qs appropriately, no gross deficits  SKIN: No visible rashes or lesions    LABS:        RADIOLOGY & ADDITIONAL TESTS:

## 2020-04-06 NOTE — PROGRESS NOTE ADULT - PROBLEM SELECTOR PLAN 4
Transitions of Care Status:  1.  Name of PCP: Dr. Kyle Conner, [phone],  2.  PCP Contacted on Admission: [x] Y    [ ] N   --- left  w/ staff  2.1 update given by previous hospitalist to pt's outpt Pulmonologist Dr. Page on 3/25  3.  PCP contacted at Discharge: [ ] Y    [ ] N    [ ] N/A  4.  Post-Discharge Appointment Date and Location:  5.  Summary of Handoff given to PCP:

## 2020-04-06 NOTE — PROGRESS NOTE ADULT - PROBLEM SELECTOR PLAN 1
- COVID-19 positive c/b hypoxic respiratory failure, now off NC and on RA  Satting >90% on RA at rest and with ambulation  - s/p Plaquenil COVID-19 dosing (3/25-3/30), now on home dose Plaquenil and home dose prednisone for Sarcoidosis and s/p 5 days azithro  C/w guaifenesin ATC, hycodan qhs, chest PT, incentive spirometry  albuterol and Atrovent HFA ATC, symbicort BID

## 2021-09-25 NOTE — H&P ADULT - PROBLEM SELECTOR PLAN 5
Pt is overdue for an appt.  Please schedule then we can ask provider for a refill   Transitions of Care Status:  1.  Name of PCP: Dr. Kyle Conner, [phone],  2.  PCP Contacted on Admission: [x] Y    [ ] N   --- left VM w/ staff  2.1 Pulmonology Dr. Page's office contacted on admission, office closed, will try to reach out again tomorrow when office opens.   3.  PCP contacted at Discharge: [ ] Y    [ ] N    [ ] N/A  4.  Post-Discharge Appointment Date and Location:  5.  Summary of Handoff given to PCP:

## 2021-11-11 NOTE — DIETITIAN INITIAL EVALUATION ADULT. - DOB: +DATEOFBIRTH
Metformin 1000 mg twice a day with food    Glipizide 10 mg - 1.5  tab am and 1.5  tab pm before meals    Nxodiotm19 mg  Once a day-      Hypoglycemia:  Low blood sugar    Definition:  Any blood sugar below 70 mg/dL.    Causes:   Skipped or delayed meals   Not enough food   Too much medication (insulin or diabetes pills)   Increased activity or exercise   Hot weather   Hot bath    Onset:  Sudden    Warning signs:   Shaking   Sweating    Dizziness   Impaired vision   Weakness and fatigue    Hunger    Fast heartbeat   Irritability    What to do:   1. Check your blood sugar level.   2. Take one dose of quick-acting sugar (15 g of carbohydrate).    Examples:     8 ounces with low-fat or fat-free milk.    6 Lifesavers candies -  Chew.    3 pieces of hard candy - Chew.    One half cup of juice or regular soda.    1 tube of glucose gel.    3-4 glucose tablets.   3. Rest for 15 minutes and repeat blood sugar.   4. Repeat steps 1,2 and 3 until blood sugar is greater than 80 and you're feeling better.   5. Make a note of this in your logbook.   6. If it will be longer than 1 hour before you eat, eat 15 g of complex carbohydrates (bread, crackers or a half a sandwich).    Call your provider if you have two or more low blood sugars in a day or more than 3 in one week.     Statement Selected

## 2022-07-28 NOTE — H&P ADULT - PROBLEM SELECTOR PLAN 3
Detail Level: Generalized
Detail Level: Detailed
dx in 2017 via lung bx on maintenance mtx and hydroxychloroquine; prednisone reduced recently to 5mg PO QD  - will hold MTX in setting of infection  - c/w home Plaquenil  - c/w home Prednisone

## 2025-01-03 NOTE — ED ADULT NURSE NOTE - DOES PATIENT HAVE ADVANCE DIRECTIVE
----- Message from Edith BRINK LPN sent at 10/15/2024  3:41 PM CDT -----  Labs every 3 months: urinalysis, urine protein/creatinine ratio, urine microalbumin/creatinine ratio   Yes